# Patient Record
Sex: FEMALE | Race: WHITE | Employment: FULL TIME | URBAN - METROPOLITAN AREA
[De-identification: names, ages, dates, MRNs, and addresses within clinical notes are randomized per-mention and may not be internally consistent; named-entity substitution may affect disease eponyms.]

---

## 2017-10-03 PROBLEM — F50.89 PICA IN ADULTS: Status: ACTIVE | Noted: 2017-10-03

## 2017-10-03 PROBLEM — R05.3 COUGH, PERSISTENT: Status: ACTIVE | Noted: 2017-07-02

## 2017-10-03 PROBLEM — F51.01 PRIMARY INSOMNIA: Status: ACTIVE | Noted: 2017-10-03

## 2017-10-03 PROBLEM — E55.9 VITAMIN D DEFICIENCY: Status: ACTIVE | Noted: 2017-10-03

## 2017-10-03 PROBLEM — Z76.89 ESTABLISHING CARE WITH NEW DOCTOR, ENCOUNTER FOR: Status: ACTIVE | Noted: 2017-10-03

## 2017-10-11 PROBLEM — N95.1 PERI-MENOPAUSE: Status: ACTIVE | Noted: 2017-10-11

## 2017-10-11 PROBLEM — D50.0 IRON DEFICIENCY ANEMIA DUE TO CHRONIC BLOOD LOSS: Status: ACTIVE | Noted: 2017-10-11

## 2017-10-26 ENCOUNTER — HOSPITAL ENCOUNTER (OUTPATIENT)
Dept: LAB | Age: 52
Discharge: HOME | End: 2017-10-26

## 2017-10-26 PROCEDURE — 88305 TISSUE EXAM BY PATHOLOGIST: CPT | Performed by: INTERNAL MEDICINE

## 2017-10-26 PROCEDURE — 88312 SPECIAL STAINS GROUP 1: CPT | Performed by: INTERNAL MEDICINE

## 2017-12-18 ENCOUNTER — HOSPITAL ENCOUNTER (OUTPATIENT)
Dept: MAMMOGRAPHY | Age: 52
Discharge: HOME | End: 2017-12-18
Attending: FAMILY MEDICINE
Payer: COMMERCIAL

## 2017-12-18 DIAGNOSIS — Z12.39 SCREENING FOR MALIGNANT NEOPLASM OF BREAST: ICD-10-CM

## 2017-12-18 PROCEDURE — 77067 SCR MAMMO BI INCL CAD: CPT

## 2018-03-29 PROBLEM — Z79.899 ENCOUNTER FOR LONG-TERM (CURRENT) USE OF MEDICATIONS: Status: ACTIVE | Noted: 2018-03-29

## 2018-06-12 PROBLEM — R00.2 HEART PALPITATIONS: Status: ACTIVE | Noted: 2018-06-12

## 2018-06-12 PROBLEM — E66.01 SEVERE OBESITY (BMI 35.0-39.9): Status: ACTIVE | Noted: 2018-06-12

## 2018-06-15 PROBLEM — E78.2 MIXED HYPERLIPIDEMIA: Status: ACTIVE | Noted: 2018-06-15

## 2018-06-15 PROBLEM — E78.2 MIXED HYPERLIPIDEMIA: Chronic | Status: ACTIVE | Noted: 2018-06-15

## 2018-06-15 PROBLEM — E55.9 VITAMIN D DEFICIENCY: Chronic | Status: ACTIVE | Noted: 2017-10-03

## 2018-11-15 PROBLEM — D50.9 IRON DEFICIENCY ANEMIA: Status: ACTIVE | Noted: 2018-11-15

## 2019-04-23 PROBLEM — M25.562 ACUTE PAIN OF LEFT KNEE: Status: ACTIVE | Noted: 2019-04-23

## 2019-04-23 PROBLEM — I10 ESSENTIAL HYPERTENSION: Status: ACTIVE | Noted: 2019-04-23

## 2020-07-29 ENCOUNTER — HOSPITAL ENCOUNTER (OUTPATIENT)
Dept: MAMMOGRAPHY | Age: 55
Discharge: HOME | End: 2020-07-29
Attending: FAMILY MEDICINE

## 2020-07-29 DIAGNOSIS — Z12.39 SCREENING FOR BREAST CANCER: ICD-10-CM

## 2021-03-10 ENCOUNTER — TRANSCRIBE ORDER (OUTPATIENT)
Dept: SCHEDULING | Age: 56
End: 2021-03-10

## 2021-03-10 DIAGNOSIS — Z12.31 VISIT FOR SCREENING MAMMOGRAM: Primary | ICD-10-CM

## 2021-06-15 PROBLEM — E66.01 SEVERE OBESITY WITH BODY MASS INDEX (BMI) OF 35.0 TO 39.9 WITH SERIOUS COMORBIDITY (HCC): Status: RESOLVED | Noted: 2018-06-12 | Resolved: 2021-06-15

## 2021-08-10 ENCOUNTER — HOSPITAL ENCOUNTER (OUTPATIENT)
Dept: MAMMOGRAPHY | Age: 56
Discharge: HOME | End: 2021-08-10

## 2021-08-10 DIAGNOSIS — Z12.31 VISIT FOR SCREENING MAMMOGRAM: ICD-10-CM

## 2021-12-30 PROBLEM — H52.10 MYOPIA: Status: ACTIVE | Noted: 2018-09-04

## 2021-12-30 PROBLEM — Z20.822 EXPOSURE TO CONFIRMED CASE OF COVID-19: Status: ACTIVE | Noted: 2021-12-30

## 2021-12-30 PROBLEM — H52.229 REGULAR ASTIGMATISM: Status: ACTIVE | Noted: 2018-09-04

## 2022-02-01 PROBLEM — U07.1 COVID-19: Status: ACTIVE | Noted: 2022-02-01

## 2022-02-01 PROBLEM — Z86.16 HISTORY OF COVID-19: Status: ACTIVE | Noted: 2022-02-01

## 2022-03-18 PROBLEM — D50.9 IRON DEFICIENCY ANEMIA: Status: ACTIVE | Noted: 2018-11-15

## 2022-03-18 PROBLEM — H52.10 MYOPIA: Status: ACTIVE | Noted: 2018-09-04

## 2022-03-18 PROBLEM — E55.9 VITAMIN D DEFICIENCY: Status: ACTIVE | Noted: 2017-10-03

## 2022-03-19 PROBLEM — D50.0 IRON DEFICIENCY ANEMIA DUE TO CHRONIC BLOOD LOSS: Status: ACTIVE | Noted: 2017-10-11

## 2022-03-19 PROBLEM — H52.229 REGULAR ASTIGMATISM: Status: ACTIVE | Noted: 2018-09-04

## 2022-03-19 PROBLEM — M25.562 ACUTE PAIN OF LEFT KNEE: Status: ACTIVE | Noted: 2019-04-23

## 2022-03-19 PROBLEM — R05.3 COUGH, PERSISTENT: Status: ACTIVE | Noted: 2017-07-02

## 2022-03-19 PROBLEM — E78.2 MIXED HYPERLIPIDEMIA: Status: ACTIVE | Noted: 2018-06-15

## 2022-03-19 PROBLEM — F50.89 PICA IN ADULTS: Status: ACTIVE | Noted: 2017-10-03

## 2022-03-19 PROBLEM — U07.1 COVID-19: Status: ACTIVE | Noted: 2022-02-01

## 2022-03-19 PROBLEM — N95.1 PERI-MENOPAUSE: Status: ACTIVE | Noted: 2017-10-11

## 2022-03-19 PROBLEM — Z86.16 HISTORY OF COVID-19: Status: ACTIVE | Noted: 2022-02-01

## 2022-03-19 PROBLEM — Z79.899 ENCOUNTER FOR LONG-TERM (CURRENT) USE OF MEDICATIONS: Status: ACTIVE | Noted: 2018-03-29

## 2022-03-19 PROBLEM — Z20.822 EXPOSURE TO CONFIRMED CASE OF COVID-19: Status: ACTIVE | Noted: 2021-12-30

## 2022-03-19 PROBLEM — F50.83 PICA IN ADULTS: Status: ACTIVE | Noted: 2017-10-03

## 2022-03-19 PROBLEM — Z76.89 ESTABLISHING CARE WITH NEW DOCTOR, ENCOUNTER FOR: Status: ACTIVE | Noted: 2017-10-03

## 2022-03-19 PROBLEM — I10 ESSENTIAL HYPERTENSION: Status: ACTIVE | Noted: 2019-04-23

## 2022-03-19 PROBLEM — R00.2 HEART PALPITATIONS: Status: ACTIVE | Noted: 2018-06-12

## 2022-03-20 PROBLEM — F51.01 PRIMARY INSOMNIA: Status: ACTIVE | Noted: 2017-10-03

## 2022-06-13 DIAGNOSIS — E55.9 VITAMIN D DEFICIENCY: ICD-10-CM

## 2022-06-13 DIAGNOSIS — E78.2 MIXED HYPERLIPIDEMIA: ICD-10-CM

## 2022-06-13 DIAGNOSIS — D50.9 IRON DEFICIENCY ANEMIA, UNSPECIFIED IRON DEFICIENCY ANEMIA TYPE: Primary | ICD-10-CM

## 2022-06-13 DIAGNOSIS — Z79.899 ENCOUNTER FOR LONG-TERM (CURRENT) USE OF MEDICATIONS: ICD-10-CM

## 2022-06-13 DIAGNOSIS — D50.9 IRON DEFICIENCY ANEMIA, UNSPECIFIED IRON DEFICIENCY ANEMIA TYPE: ICD-10-CM

## 2022-06-14 LAB
25(OH)D3 SERPL-MCNC: 46.6 NG/ML (ref 30–100)
ALBUMIN SERPL-MCNC: 3.9 G/DL (ref 3.5–5)
ALBUMIN/GLOB SERPL: 1.3 {RATIO} (ref 1.2–3.5)
ALP SERPL-CCNC: 80 U/L (ref 50–136)
ALT SERPL-CCNC: 20 U/L (ref 12–65)
ANION GAP SERPL CALC-SCNC: 12 MMOL/L (ref 7–16)
AST SERPL-CCNC: 18 U/L (ref 15–37)
BASOPHILS # BLD: 0 K/UL (ref 0–0.2)
BASOPHILS NFR BLD: 1 % (ref 0–2)
BILIRUB SERPL-MCNC: 0.4 MG/DL (ref 0.2–1.1)
BUN SERPL-MCNC: 17 MG/DL (ref 6–23)
CALCIUM SERPL-MCNC: 9.5 MG/DL (ref 8.3–10.4)
CHLORIDE SERPL-SCNC: 110 MMOL/L (ref 98–107)
CHOLEST SERPL-MCNC: 171 MG/DL
CO2 SERPL-SCNC: 21 MMOL/L (ref 21–32)
CREAT SERPL-MCNC: 0.7 MG/DL (ref 0.6–1)
DIFFERENTIAL METHOD BLD: ABNORMAL
EOSINOPHIL # BLD: 0.1 K/UL (ref 0–0.8)
EOSINOPHIL NFR BLD: 2 % (ref 0.5–7.8)
ERYTHROCYTE [DISTWIDTH] IN BLOOD BY AUTOMATED COUNT: 14.2 % (ref 11.9–14.6)
GLOBULIN SER CALC-MCNC: 3 G/DL (ref 2.3–3.5)
GLUCOSE SERPL-MCNC: 76 MG/DL (ref 65–100)
HCT VFR BLD AUTO: 35.7 % (ref 35.8–46.3)
HDLC SERPL-MCNC: 58 MG/DL (ref 40–60)
HDLC SERPL: 2.9 {RATIO}
HGB BLD-MCNC: 11.1 G/DL (ref 11.7–15.4)
IMM GRANULOCYTES # BLD AUTO: 0 K/UL (ref 0–0.5)
IMM GRANULOCYTES NFR BLD AUTO: 0 % (ref 0–5)
LDLC SERPL CALC-MCNC: 94.2 MG/DL
LYMPHOCYTES # BLD: 2.1 K/UL (ref 0.5–4.6)
LYMPHOCYTES NFR BLD: 35 % (ref 13–44)
MCH RBC QN AUTO: 29 PG (ref 26.1–32.9)
MCHC RBC AUTO-ENTMCNC: 31.1 G/DL (ref 31.4–35)
MCV RBC AUTO: 93.2 FL (ref 79.6–97.8)
MONOCYTES # BLD: 0.4 K/UL (ref 0.1–1.3)
MONOCYTES NFR BLD: 6 % (ref 4–12)
NEUTS SEG # BLD: 3.5 K/UL (ref 1.7–8.2)
NEUTS SEG NFR BLD: 56 % (ref 43–78)
NRBC # BLD: 0 K/UL (ref 0–0.2)
PLATELET # BLD AUTO: 274 K/UL (ref 150–450)
PMV BLD AUTO: 10.7 FL (ref 9.4–12.3)
POTASSIUM SERPL-SCNC: 4.3 MMOL/L (ref 3.5–5.1)
PROT SERPL-MCNC: 6.9 G/DL (ref 6.3–8.2)
RBC # BLD AUTO: 3.83 M/UL (ref 4.05–5.2)
SODIUM SERPL-SCNC: 143 MMOL/L (ref 136–145)
TRIGL SERPL-MCNC: 94 MG/DL (ref 35–150)
VLDLC SERPL CALC-MCNC: 18.8 MG/DL (ref 6–23)
WBC # BLD AUTO: 6.1 K/UL (ref 4.3–11.1)

## 2022-06-20 ENCOUNTER — OFFICE VISIT (OUTPATIENT)
Dept: PRIMARY CARE CLINIC | Facility: CLINIC | Age: 57
End: 2022-06-20
Payer: COMMERCIAL

## 2022-06-20 VITALS
WEIGHT: 215 LBS | BODY MASS INDEX: 35.82 KG/M2 | DIASTOLIC BLOOD PRESSURE: 75 MMHG | TEMPERATURE: 97.9 F | OXYGEN SATURATION: 99 % | HEIGHT: 65 IN | SYSTOLIC BLOOD PRESSURE: 136 MMHG | HEART RATE: 58 BPM

## 2022-06-20 DIAGNOSIS — Z12.31 ENCOUNTER FOR SCREENING MAMMOGRAM FOR MALIGNANT NEOPLASM OF BREAST: ICD-10-CM

## 2022-06-20 DIAGNOSIS — D50.8 OTHER IRON DEFICIENCY ANEMIA: ICD-10-CM

## 2022-06-20 DIAGNOSIS — E55.9 VITAMIN D DEFICIENCY: ICD-10-CM

## 2022-06-20 DIAGNOSIS — I10 ESSENTIAL HYPERTENSION: Primary | ICD-10-CM

## 2022-06-20 DIAGNOSIS — J30.1 SEASONAL ALLERGIC RHINITIS DUE TO POLLEN: ICD-10-CM

## 2022-06-20 DIAGNOSIS — Z79.899 ENCOUNTER FOR LONG-TERM (CURRENT) USE OF MEDICATIONS: ICD-10-CM

## 2022-06-20 PROBLEM — C44.311 BASAL CELL CARCINOMA OF NOSE: Status: ACTIVE | Noted: 2022-06-20

## 2022-06-20 PROCEDURE — 99213 OFFICE O/P EST LOW 20 MIN: CPT | Performed by: FAMILY MEDICINE

## 2022-06-20 RX ORDER — IRON POLYSACCHARIDE COMPLEX 150 MG
150 CAPSULE ORAL DAILY
Qty: 60 CAPSULE | Refills: 5 | Status: SHIPPED | OUTPATIENT
Start: 2022-06-20

## 2022-06-20 RX ORDER — FLUTICASONE PROPIONATE 50 MCG
2 SPRAY, SUSPENSION (ML) NASAL DAILY
Qty: 16 G | Refills: 5 | Status: SHIPPED | OUTPATIENT
Start: 2022-06-20

## 2022-06-20 RX ORDER — ERGOCALCIFEROL (VITAMIN D2) 1250 MCG
50000 CAPSULE ORAL
Qty: 4 CAPSULE | Refills: 5 | Status: SHIPPED | OUTPATIENT
Start: 2022-06-20

## 2022-06-20 SDOH — ECONOMIC STABILITY: FOOD INSECURITY: WITHIN THE PAST 12 MONTHS, THE FOOD YOU BOUGHT JUST DIDN'T LAST AND YOU DIDN'T HAVE MONEY TO GET MORE.: NEVER TRUE

## 2022-06-20 SDOH — ECONOMIC STABILITY: FOOD INSECURITY: WITHIN THE PAST 12 MONTHS, YOU WORRIED THAT YOUR FOOD WOULD RUN OUT BEFORE YOU GOT MONEY TO BUY MORE.: NEVER TRUE

## 2022-06-20 ASSESSMENT — PATIENT HEALTH QUESTIONNAIRE - PHQ9
SUM OF ALL RESPONSES TO PHQ QUESTIONS 1-9: 1
2. FEELING DOWN, DEPRESSED OR HOPELESS: 0
SUM OF ALL RESPONSES TO PHQ QUESTIONS 1-9: 1
SUM OF ALL RESPONSES TO PHQ9 QUESTIONS 1 & 2: 1
1. LITTLE INTEREST OR PLEASURE IN DOING THINGS: 1

## 2022-06-20 ASSESSMENT — SOCIAL DETERMINANTS OF HEALTH (SDOH): HOW HARD IS IT FOR YOU TO PAY FOR THE VERY BASICS LIKE FOOD, HOUSING, MEDICAL CARE, AND HEATING?: NOT HARD AT ALL

## 2022-06-20 NOTE — PROGRESS NOTES
Masood Yi M.D.  [address] [facility] lenin Mahmood  Phone:  [phone]  Fax:  [phone]:  Chief Complaint   Patient presents with    Results     pt is here to discuss recent lab test results     Hypertension     pt states blood pressure is up and down. It gets to 150 some days, generally over 78           HISTORY OF PRESENT ILLNESS:  Ms. Starla Denver is a 59 y.o. female  who presents for follow up. Patient states that her blood pressures have been up and down. Some days it is 150/78. Today it is good at 136/75. She denies any chest pain, shortness of breath, headaches, or blurred vision. No other complaints. Taking medications as prescribed. Medications reviewed and updated. HISTORY:  No Known Allergies      REVIEW OF SYSTEMS:  Review of systems is as indicated in HPI otherwise negative. PHYSICAL EXAM:  Vital Signs -   Visit Vitals  /75   Pulse 58   Temp 97.9 °F (36.6 °C) (Temporal)   Ht 5' 5\" (1.651 m)   Wt 215 lb (97.5 kg)   SpO2 99%   BMI 35.78 kg/m²        Physical Exam  Vitals and nursing note reviewed. Constitutional:       Appearance: Normal appearance. HENT:      Head: Normocephalic and atraumatic. Nose: Nose normal.      Mouth/Throat:      Mouth: Mucous membranes are moist.   Eyes:      Extraocular Movements: Extraocular movements intact. Pupils: Pupils are equal, round, and reactive to light. Cardiovascular:      Rate and Rhythm: Normal rate and regular rhythm. Pulses: Normal pulses. Pulmonary:      Effort: Pulmonary effort is normal.      Breath sounds: Normal breath sounds. Abdominal:      General: Abdomen is flat. Palpations: Abdomen is soft. Musculoskeletal:         General: Normal range of motion. Cervical back: Normal range of motion and neck supple. Skin:     General: Skin is warm and dry. Neurological:      Mental Status: She is alert.    Psychiatric:         Mood and Affect: Mood normal.         Behavior: Behavior normal.       PHQ:  PHQ-9  6/20/2022   Little interest or pleasure in doing things 1   Little interest or pleasure in doing things -   Feeling down, depressed, or hopeless 0   PHQ-2 Score 1   Total Score PHQ 2 -   PHQ-9 Total Score 1       LABS    Orders Only on 06/13/2022   Component Date Value Ref Range Status    Vit D, 25-Hydroxy 06/13/2022 46.6  30.0 - 100.0 ng/mL Final    Cholesterol, Total 06/13/2022 171  <200 MG/DL Final    Comment: Borderline High: 200-239 mg/dL  High: Greater than or equal to 240 mg/dL      Triglycerides 06/13/2022 94  35 - 150 MG/DL Final    Comment: Borderline High: 150-199 mg/dL, High: 200-499 mg/dL  Very High: Greater than or equal to 500 mg/dL      HDL 06/13/2022 58  40 - 60 MG/DL Final    LDL Calculated 06/13/2022 94.2  <100 MG/DL Final    Comment: Near Optimal: 100-129 mg/dL  Borderline High: 130-159, High: 160-189 mg/dL  Very High: Greater than or equal to 190 mg/dL      VLDL Cholesterol Calculated 06/13/2022 18.8  6.0 - 23.0 MG/DL Final    Chol/HDL Ratio 06/13/2022 2.9    Final    Sodium 06/13/2022 143  136 - 145 mmol/L Final    Potassium 06/13/2022 4.3  3.5 - 5.1 mmol/L Final    Chloride 06/13/2022 110* 98 - 107 mmol/L Final    CO2 06/13/2022 21  21 - 32 mmol/L Final    Anion Gap 06/13/2022 12  7 - 16 mmol/L Final    Glucose 06/13/2022 76  65 - 100 mg/dL Final    BUN 06/13/2022 17  6 - 23 MG/DL Final    CREATININE 06/13/2022 0.70  0.6 - 1.0 MG/DL Final    GFR  06/13/2022 >60  >60 ml/min/1.73m2 Final    GFR Non- 06/13/2022 >60  >60 ml/min/1.73m2 Final    Comment:   Estimated GFR is calculated using the Modification of Diet in Renal Disease (MDRD) Study equation, reported for both  Americans (GFRAA) and non- Americans (GFRNA), and normalized to 1.73m2 body surface area. The physician must decide which value applies to the patient.   The MDRD study equation should only be used in individuals age 25 or older. It has not been validated for the following: pregnant women, patients with serious comorbid conditions,or on certain medications, or persons with extremes of body size, muscle mass, or nutritional status.       Calcium 06/13/2022 9.5  8.3 - 10.4 MG/DL Final    Total Bilirubin 06/13/2022 0.4  0.2 - 1.1 MG/DL Final    ALT 06/13/2022 20  12 - 65 U/L Final    AST 06/13/2022 18  15 - 37 U/L Final    Alk Phosphatase 06/13/2022 80  50 - 136 U/L Final    Total Protein 06/13/2022 6.9  6.3 - 8.2 g/dL Final    Albumin 06/13/2022 3.9  3.5 - 5.0 g/dL Final    Globulin 06/13/2022 3.0  2.3 - 3.5 g/dL Final    Albumin/Globulin Ratio 06/13/2022 1.3  1.2 - 3.5   Final    WBC 06/13/2022 6.1  4.3 - 11.1 K/uL Final    RBC 06/13/2022 3.83* 4.05 - 5.2 M/uL Final    Hemoglobin 06/13/2022 11.1* 11.7 - 15.4 g/dL Final    Hematocrit 06/13/2022 35.7* 35.8 - 46.3 % Final    MCV 06/13/2022 93.2  79.6 - 97.8 FL Final    MCH 06/13/2022 29.0  26.1 - 32.9 PG Final    MCHC 06/13/2022 31.1* 31.4 - 35.0 g/dL Final    RDW 06/13/2022 14.2  11.9 - 14.6 % Final    Platelets 41/62/5847 274  150 - 450 K/uL Final    MPV 06/13/2022 10.7  9.4 - 12.3 FL Final    nRBC 06/13/2022 0.00  0.0 - 0.2 K/uL Final    **Note: Absolute NRBC parameter is now reported with Hemogram**    Differential Type 06/13/2022 AUTOMATED    Final    Seg Neutrophils 06/13/2022 56  43 - 78 % Final    Lymphocytes 06/13/2022 35  13 - 44 % Final    Monocytes 06/13/2022 6  4.0 - 12.0 % Final    Eosinophils % 06/13/2022 2  0.5 - 7.8 % Final    Basophils 06/13/2022 1  0.0 - 2.0 % Final    Immature Granulocytes 06/13/2022 0  0.0 - 5.0 % Final    Segs Absolute 06/13/2022 3.5  1.7 - 8.2 K/UL Final    Absolute Lymph # 06/13/2022 2.1  0.5 - 4.6 K/UL Final    Absolute Mono # 06/13/2022 0.4  0.1 - 1.3 K/UL Final    Absolute Eos # 06/13/2022 0.1  0.0 - 0.8 K/UL Final    Basophils Absolute 06/13/2022 0.0  0.0 - 0.2 K/UL Final    Absolute Immature Granulocyte 06/13/2022 0.0  0.0 - 0.5 K/UL Final       IMPRESSION/PLAN     Diagnosis Orders   1. Essential hypertension     2. Other iron deficiency anemia  iron polysaccharides (NIFEREX) 150 MG capsule   3. Seasonal allergic rhinitis due to pollen  fluticasone (FLONASE) 50 MCG/ACT nasal spray   4. Vitamin D deficiency  ergocalciferol (ERGOCALCIFEROL) 1.25 MG (28138 UT) capsule   5. Encounter for screening mammogram for malignant neoplasm of breast  JAYMIE RUTH DIGITAL SCREEN BILATERAL   6. Encounter for long-term (current) use of medications         Follow up and Dispositions:  Return in about 6 months (around 12/20/2022). Patient is stable on medications and will continue current medications. Refilled the above medications. Reviewed medications and side effects in detail. Was given samples of   Reviewed most recent labs. Reviewed diet, exercise and weight control. Cardiovascular risks and recommendations reviewed. Patient encouraged to follow a low sodium diet. Use of aspirin to prevent MIs and TIAs discussed. Will order her screening mammogram.    Marshal Babinski, MD    Dictated using voice recognition software.  Proofread, but unrecognized voice recognition errors may exist.

## 2022-07-15 ENCOUNTER — HOSPITAL ENCOUNTER (EMERGENCY)
Dept: GENERAL RADIOLOGY | Age: 57
Discharge: HOME | End: 2022-07-18
Payer: COMMERCIAL

## 2022-07-15 ENCOUNTER — HOSPITAL ENCOUNTER (EMERGENCY)
Age: 57
Discharge: HOME | End: 2022-07-15
Attending: EMERGENCY MEDICINE
Payer: COMMERCIAL

## 2022-07-15 ENCOUNTER — TELEPHONE (OUTPATIENT)
Dept: PRIMARY CARE CLINIC | Facility: CLINIC | Age: 57
End: 2022-07-15

## 2022-07-15 VITALS
WEIGHT: 206 LBS | OXYGEN SATURATION: 97 % | SYSTOLIC BLOOD PRESSURE: 149 MMHG | BODY MASS INDEX: 34.32 KG/M2 | HEART RATE: 59 BPM | RESPIRATION RATE: 15 BRPM | HEIGHT: 65 IN | DIASTOLIC BLOOD PRESSURE: 78 MMHG | TEMPERATURE: 97.8 F

## 2022-07-15 DIAGNOSIS — R00.2 PALPITATIONS: Primary | ICD-10-CM

## 2022-07-15 LAB
ALBUMIN SERPL-MCNC: 3.5 G/DL (ref 3.5–5)
ALBUMIN/GLOB SERPL: 0.9 {RATIO} (ref 1.2–3.5)
ALP SERPL-CCNC: 79 U/L (ref 50–136)
ALT SERPL-CCNC: 19 U/L (ref 12–65)
ANION GAP SERPL CALC-SCNC: 2 MMOL/L (ref 7–16)
AST SERPL-CCNC: 18 U/L (ref 15–37)
BILIRUB SERPL-MCNC: 0.3 MG/DL (ref 0.2–1.1)
BUN SERPL-MCNC: 18 MG/DL (ref 6–23)
CALCIUM SERPL-MCNC: 9.2 MG/DL (ref 8.3–10.4)
CHLORIDE SERPL-SCNC: 109 MMOL/L (ref 98–107)
CO2 SERPL-SCNC: 30 MMOL/L (ref 21–32)
CREAT SERPL-MCNC: 0.7 MG/DL (ref 0.6–1)
EKG ATRIAL RATE: 65 BPM
EKG DIAGNOSIS: NORMAL
EKG P AXIS: 43 DEGREES
EKG P-R INTERVAL: 154 MS
EKG Q-T INTERVAL: 396 MS
EKG QRS DURATION: 76 MS
EKG QTC CALCULATION (BAZETT): 411 MS
EKG R AXIS: 26 DEGREES
EKG T AXIS: 34 DEGREES
EKG VENTRICULAR RATE: 65 BPM
ERYTHROCYTE [DISTWIDTH] IN BLOOD BY AUTOMATED COUNT: 13.5 % (ref 11.9–14.6)
GLOBULIN SER CALC-MCNC: 3.9 G/DL (ref 2.3–3.5)
GLUCOSE SERPL-MCNC: 79 MG/DL (ref 65–100)
HCT VFR BLD AUTO: 36.7 % (ref 35.8–46.3)
HGB BLD-MCNC: 11.9 G/DL (ref 11.7–15.4)
LIPASE SERPL-CCNC: 132 U/L (ref 73–393)
MAGNESIUM SERPL-MCNC: 2.1 MG/DL (ref 1.8–2.4)
MCH RBC QN AUTO: 28.9 PG (ref 26.1–32.9)
MCHC RBC AUTO-ENTMCNC: 32.4 G/DL (ref 31.4–35)
MCV RBC AUTO: 89.1 FL (ref 79.6–97.8)
NRBC # BLD: 0 K/UL (ref 0–0.2)
PLATELET # BLD AUTO: 257 K/UL (ref 150–450)
PMV BLD AUTO: 10.3 FL (ref 9.4–12.3)
POTASSIUM SERPL-SCNC: 4.1 MMOL/L (ref 3.5–5.1)
PROT SERPL-MCNC: 7.4 G/DL (ref 6.3–8.2)
RBC # BLD AUTO: 4.12 M/UL (ref 4.05–5.2)
SODIUM SERPL-SCNC: 141 MMOL/L (ref 136–145)
TROPONIN I SERPL HS-MCNC: 6.3 PG/ML (ref 0–14)
TROPONIN I SERPL HS-MCNC: 8 PG/ML (ref 0–14)
WBC # BLD AUTO: 5.7 K/UL (ref 4.3–11.1)

## 2022-07-15 PROCEDURE — 93005 ELECTROCARDIOGRAM TRACING: CPT | Performed by: EMERGENCY MEDICINE

## 2022-07-15 PROCEDURE — 83735 ASSAY OF MAGNESIUM: CPT

## 2022-07-15 PROCEDURE — 80053 COMPREHEN METABOLIC PANEL: CPT

## 2022-07-15 PROCEDURE — 94762 N-INVAS EAR/PLS OXIMTRY CONT: CPT

## 2022-07-15 PROCEDURE — 85027 COMPLETE CBC AUTOMATED: CPT

## 2022-07-15 PROCEDURE — 99285 EMERGENCY DEPT VISIT HI MDM: CPT

## 2022-07-15 PROCEDURE — 71046 X-RAY EXAM CHEST 2 VIEWS: CPT

## 2022-07-15 PROCEDURE — 83690 ASSAY OF LIPASE: CPT

## 2022-07-15 PROCEDURE — 84484 ASSAY OF TROPONIN QUANT: CPT

## 2022-07-15 ASSESSMENT — LIFESTYLE VARIABLES: HOW OFTEN DO YOU HAVE A DRINK CONTAINING ALCOHOL: NEVER

## 2022-07-15 ASSESSMENT — PAIN DESCRIPTION - DESCRIPTORS: DESCRIPTORS: PRESSURE

## 2022-07-15 ASSESSMENT — PAIN SCALES - GENERAL: PAINLEVEL_OUTOF10: 4

## 2022-07-15 ASSESSMENT — PAIN DESCRIPTION - LOCATION: LOCATION: CHEST

## 2022-07-15 NOTE — DISCHARGE INSTRUCTIONS
Have referred you to [facility] cardiology and I have called a consult into them.   You will most likely hear from [facility] cardiology on Monday if you do not hear from them by midday on Monday call the office  Return at any point with prolonged episode of sense of palpitations  Aspirin 81 mg/day until follow-up then as per their recommendations

## 2022-07-15 NOTE — ED NOTES
I have reviewed discharge instructions with the patient. The patient verbalized understanding. Patient left ED via Discharge Method: ambulatory to Home with (Spouse). Opportunity for questions and clarification provided. Patient given 0 scripts. To continue your aftercare when you leave the hospital, you may receive an automated call from our care team to check in on how you are doing. This is a free service and part of our promise to provide the best care and service to meet your aftercare needs.  If you have questions, or wish to unsubscribe from this service please call [phone]. Thank you for Choosing our [facility] Emergency Department.         Arian Pérez RN  07/15/22 1281

## 2022-07-15 NOTE — ED PROVIDER NOTES
[facility] Emergency Department Provider Note                   PCP:                Millicent Jeans, MD               Age: 59 y.o. Sex: female     No diagnosis found. DISPOSITION         MDM  Number of Diagnoses or Management Options  Palpitations: new, needed workup  Diagnosis management comments: We will have patient follow-up with [facility] cardiology. She is stable throughout her stay in our department. Lab work has no identified precipitating cause. She does not have any anginal component associated with this. She is otherwise baseline healthy. A call has been placed [facility] cardiology for follow-up. Patient is to return with any worsening. Amount and/or Complexity of Data Reviewed  Clinical lab tests: ordered and reviewed  Tests in the radiology section of CPT®: ordered and reviewed  Obtain history from someone other than the patient: yes  Discuss the patient with other providers: yes    Risk of Complications, Morbidity, and/or Mortality  Presenting problems: high  Diagnostic procedures: high  Management options: high    Patient Progress  Patient progress: stable       Orders Placed This Encounter   Procedures    XR CHEST (2 VW)    CBC    Comprehensive Metabolic Panel    Troponin    Lipase    Magnesium    Pulse Oximetry    Cardiac Monitor    EKG 12 Lead    Saline lock IV        Elaine Berman is a 59 y.o. female who presents to the Emergency Department with chief complaint of    Chief Complaint   Patient presents with    Palpitations      For several months she has had a sense of palpations though intermittent. Time she says it feels as though her heart might be fluttering. When asked the duration of the symptoms last anywhere from a second or 20 sec to maybe 1 minute but never longer. She is otherwise healthy. No his free of excess caffeine use and does not drink energy drinks or coffee. She does not have any cardiac history. No fever or infectious changes.   No history of thyroid abnormalities. No new medicines. No acute infectious changes or blood loss. No history of DVT or PE type symptoms or complaints    The history is provided by the patient and the spouse. Palpitations  Palpitations quality:  Fast  Timing:  Sporadic  Context: not anxiety, not appetite suppressants, not blood loss, not caffeine, not illicit drugs and not nicotine    Relieved by: Spontaneous without intervention. Associated symptoms: no diaphoresis, no leg pain, no lower extremity edema, no orthopnea, no PND and no syncope      All other systems reviewed and are negative. Review of Systems   Constitutional:  Negative for chills, diaphoresis and fatigue. Cardiovascular:  Positive for palpitations. Negative for orthopnea, syncope and PND. Endocrine: Negative for heat intolerance. No hyperthyroid changes   Neurological: Negative. Psychiatric/Behavioral:  Negative for confusion and decreased concentration. All other systems reviewed and are negative. Past Medical History:   Diagnosis Date    Cervical polyp     Skin cancer         Past Surgical History:   Procedure Laterality Date    OTHER SURGICAL HISTORY Left 09/27/2018    Skin Cancer Removal (Left Arm)    OTHER SURGICAL HISTORY      skin cancer removed from arm and face 2005    TUBAL LIGATION          Family History   Problem Relation Age of Onset    Lung Disease Mother     Kidney Disease Father     Heart Disease Mother         Social Connections: Not on file        No Known Allergies     Vitals signs and nursing note reviewed. Patient Vitals for the past 4 hrs:   Temp Pulse Resp BP SpO2   07/15/22 1636 -- 61 18 (!) 142/71 100 %   07/15/22 1345 97.8 °F (36.6 °C) 61 16 (!) 152/81 100 %          Physical Exam  Vitals and nursing note reviewed. HENT:      Head: Atraumatic.       Right Ear: External ear normal.      Left Ear: External ear normal.      Nose: Nose normal.      Mouth/Throat:      Mouth: Mucous membranes are moist.   Eyes: General: No scleral icterus. Cardiovascular:      Rate and Rhythm: Normal rate and regular rhythm. Pulses: Normal pulses. Pulmonary:      Breath sounds: No wheezing or rales. Abdominal:      Palpations: Abdomen is soft. Musculoskeletal:         General: No swelling or tenderness. Cervical back: Normal range of motion. Right lower leg: No edema. Left lower leg: No edema. Skin:     General: Skin is warm and dry. Coloration: Skin is not pale. Neurological:      Mental Status: She is alert. Mental status is at baseline. Psychiatric:         Behavior: Behavior normal.        Procedures    ED EKG Interpretation  EKG was interpreted in the absence of a cardiologist.    Rate: Rate: Normal  EKG Interpretation: EKG Interpretation: sinus rhythm  ST Segments: Normal ST segments - NO STEMI    Labs Reviewed   COMPREHENSIVE METABOLIC PANEL - Abnormal; Notable for the following components:       Result Value    Chloride 109 (*)     Anion Gap 2 (*)     Globulin 3.9 (*)     Albumin/Globulin Ratio 0.9 (*)     All other components within normal limits   CBC   TROPONIN   LIPASE   MAGNESIUM   TROPONIN        XR CHEST (2 VW)   Final Result   No acute findings in the chest                                  Voice dictation software was used during the making of this note. This software is not perfect and grammatical and other typographical errors may be present. This note has not been completely proofread for errors.      Marquis Scott MD  07/17/22 8053

## 2022-07-15 NOTE — ED TRIAGE NOTES
Pt arrives ambulatory to triage. Reports palpitations, chest pressure, and shortness of breath for \"a little while\" but states it had been more \"steady\" for 3-4 days. Pt denies cardiac or pulmonary hx. Pt denies hx of anxiety. Denies coughing. Denies swelling to legs or arms, denies any recent long travel. Denies smoking cigarettes. Denies any aggravating or alleviating factors. NAD. Masked.

## 2022-07-15 NOTE — TELEPHONE ENCOUNTER
Patient called c/o shortness of breath, rapid heart rate and a pressure on the chest. Per Dr Wilberto Wu recommendation patient should report to ER directly either by EMS or someone should drive her.  Pt acknowledges and will proceed

## 2022-07-17 ASSESSMENT — ENCOUNTER SYMPTOMS: ORTHOPNEA: 0

## 2022-08-12 ENCOUNTER — HOSPITAL ENCOUNTER (OUTPATIENT)
Dept: MAMMOGRAPHY | Age: 57
Discharge: HOME | End: 2022-08-15
Payer: COMMERCIAL

## 2022-08-12 DIAGNOSIS — Z12.31 ENCOUNTER FOR SCREENING MAMMOGRAM FOR MALIGNANT NEOPLASM OF BREAST: ICD-10-CM

## 2022-08-12 PROCEDURE — 77063 BREAST TOMOSYNTHESIS BI: CPT

## 2022-08-22 ENCOUNTER — OFFICE VISIT (OUTPATIENT)
Dept: CARDIOLOGY CLINIC | Age: 57
End: 2022-08-22
Payer: COMMERCIAL

## 2022-08-22 VITALS
DIASTOLIC BLOOD PRESSURE: 90 MMHG | SYSTOLIC BLOOD PRESSURE: 140 MMHG | BODY MASS INDEX: 35.82 KG/M2 | WEIGHT: 215 LBS | HEART RATE: 68 BPM | HEIGHT: 65 IN

## 2022-08-22 DIAGNOSIS — I10 ESSENTIAL HYPERTENSION: ICD-10-CM

## 2022-08-22 DIAGNOSIS — R00.2 HEART PALPITATIONS: Primary | ICD-10-CM

## 2022-08-22 DIAGNOSIS — I49.3 PVC'S (PREMATURE VENTRICULAR CONTRACTIONS): ICD-10-CM

## 2022-08-22 DIAGNOSIS — D50.0 IRON DEFICIENCY ANEMIA DUE TO CHRONIC BLOOD LOSS: ICD-10-CM

## 2022-08-22 PROCEDURE — 99204 OFFICE O/P NEW MOD 45 MIN: CPT | Performed by: INTERNAL MEDICINE

## 2022-08-22 ASSESSMENT — ENCOUNTER SYMPTOMS
DOUBLE VISION: 0
ABDOMINAL PAIN: 0
EYE REDNESS: 0
WHEEZING: 0
HEMATOCHEZIA: 0
HEMATEMESIS: 0
HOARSE VOICE: 0
STRIDOR: 0
HEMOPTYSIS: 0

## 2022-08-22 NOTE — PROGRESS NOTES
[address] [address] Levi Orellana, [address] [facility], [address] [place] [address]  PHONE: [phone]          22    NAME:  Abdoul Alfaro  : 1965  MRN: [id number]         SUBJECTIVE:   Abdoul Alfaro is a 59 y.o. female seen for a visit regarding the following:     Chief Complaint   Patient presents with    Hyperlipidemia    Hypertension           HPI:    Cardio problem list:  1. Palpitations  -3-day Holter monitor from 2022 showed sinus rhythm with an average heart rate at 71 bpm, rare PACs and PVCs  2.  Borderline hypertension  3. Iron deficiency anemia  4. Hyperlipidemia-diet controlled    Dear Dr. Minor Zavala,  I saw Ms. Singer who is a pleasant 78-year-old woman in cardiovascular consultation on 2022 for palpitations with borderline hypertension and diet-controlled hyperlipidemia. She went to the ER on 7/15/2022 with palpitations and her baseline work-up came back fairly normal apart from occasional PVCs. Palpitations/PVCs-she has had palpitations off and on now for a few months but lately, over the last month, she has noticed that it has been more frequent. She had them for 3 days in a row close to when she went into the ER on 7/15/2022 and the initial work-up came back with no concerning laboratory findings. Thyroid function has not been recently checked. Borderline anemic, normal magnesium level and potassium levels. Does not do any excessive caffeine intake. Denies coffee intake of sodas. Occasionally has some diluted tea. -Stress brings on her palpitations but nothing else. Sometimes feels some associated shortness of breath 1 day, when they come in a row and go up into her neck. Sometimes associated with a mild cough. Hypertension: Borderline elevated pressures. Denies headaches or blurry vision. No formal diagnosis of hypertension in the past.  Denies significant lower extremity edema orthopnea PND. Hyperlipidemia-diet controlled for now.     Iron deficiency anemia-has been worked up by GI with no obvious etiology. Still following with Dr. Alee Bynum still on this. On iron supplementation. No complaints of any anginal chest discomfort in any way. No TIAs or strokelike symptoms. Past Medical History, Past Surgical History, Family history, Social History, and Medications were all reviewed with the patient today and updated as necessary.      No Known Allergies  Patient Active Problem List   Diagnosis    Myopia    Iron deficiency anemia    Vitamin D deficiency    Heart palpitations    Pica in adults    Mixed hyperlipidemia    Encounter for long-term (current) use of medications    Establishing care with new doctor, encounter for    Essential hypertension    Acute pain of left knee    History of COVID-19    Regular astigmatism    COVID-19    Exposure to confirmed case of COVID-19    Allison-menopause    Cough, persistent    Iron deficiency anemia due to chronic blood loss    Presbyopia    Primary insomnia    Basal cell carcinoma of nose    Seasonal allergic rhinitis due to pollen     Past Medical History:   Diagnosis Date    Cervical polyp     Skin cancer      Past Surgical History:   Procedure Laterality Date    OTHER SURGICAL HISTORY Left 09/27/2018    Skin Cancer Removal (Left Arm)    OTHER SURGICAL HISTORY      skin cancer removed from arm and face 2005    TUBAL LIGATION       Family History   Problem Relation Age of Onset    Lung Disease Mother     Kidney Disease Father     Heart Disease Mother      Social History     Tobacco Use    Smoking status: Never    Smokeless tobacco: Never   Substance Use Topics    Alcohol use: No     Current Outpatient Medications   Medication Sig Dispense Refill    fluticasone (FLONASE) 50 MCG/ACT nasal spray 2 sprays by Nasal route daily 16 g 5    ergocalciferol (ERGOCALCIFEROL) 1.25 MG (59104 UT) capsule Take 1 capsule by mouth every 7 days 4 capsule 5    iron polysaccharides (NIFEREX) 150 MG capsule Take 1 capsule by mouth daily 60 capsule 5 melatonin 5 MG TABS tablet Take 5 mg by mouth       No current facility-administered medications for this visit. Review of Systems   Constitutional: Negative for chills and fever. HENT:  Negative for ear discharge, hoarse voice and stridor. Eyes:  Negative for double vision and redness. Cardiovascular:  Positive for palpitations. Negative for cyanosis and syncope. Respiratory:  Negative for hemoptysis and wheezing. Endocrine: Negative for polydipsia and polyphagia. Hematologic/Lymphatic: Negative for adenopathy. Skin:  Negative for itching and rash. Musculoskeletal:  Negative for joint swelling and muscle weakness. Gastrointestinal:  Negative for abdominal pain, hematemesis and hematochezia. Genitourinary:  Negative for flank pain and nocturia. Neurological:  Negative for focal weakness and seizures. Psychiatric/Behavioral:  Negative for altered mental status and suicidal ideas. Allergic/Immunologic: Negative for hives. PHYSICAL EXAM:    BP (!) 140/90   Pulse 68   Ht 5' 5\" (1.651 m)   Wt 215 lb (97.5 kg)   BMI 35.78 kg/m²      Physical Exam    General: Alert and oriented in no acute distress  HEENT: Head is normocephalic, atraumatic, pupils are equal bilaterally, throat appears to be clear  Neck: No significant jugular venous distention no cervical bruits  Cardiovascular: S1 and S2 heard, regular rate and rhythm, no significant murmurs rubs or gallops. Respiratory: Clear to auscultation bilaterally with no adventitious sounds, respirations are normal  Abdomen: Soft, nontender, nondistended, bowel sounds present. Extremities: No cyanosis clubbing or edema  Peripheral pulses: Bilateral radial artery pulses are palpated. Bilateral pedal pulses are well felt. Neuro: No facial droop and no gross focal motor deficits  Lymphatic: No significant cervical lymphadenopathy noted. Musculoskeletal: No significant redness or swelling noted in all exposed joints.   Skin: No significant rashes noted the of the exposed regions. Medical problems and test results were reviewed with the patient today. No results found for this or any previous visit (from the past 672 hour(s)). Lab Results   Component Value Date/Time    CHOL 171 06/13/2022 11:43 AM    HDL 58 06/13/2022 11:43 AM   ,hemoglobin, basic metabolic panel, No results found for: TSH, TSH2, TSH3 ,  Lab Results   Component Value Date/Time     07/15/2022 02:12 PM    K 4.1 07/15/2022 02:12 PM     07/15/2022 02:12 PM    CO2 30 07/15/2022 02:12 PM    BUN 18 07/15/2022 02:12 PM    GFRAA >60 07/15/2022 02:12 PM    GLOB 3.9 07/15/2022 02:12 PM    ALT 19 07/15/2022 02:12 PM    AST 18 07/15/2022 02:12 PM      Lab Results   Component Value Date    LDLCALC 94.2 06/13/2022      Lab Results   Component Value Date    CREATININE 0.70 07/15/2022      Lab Results   Component Value Date/Time    MG 2.1 07/15/2022 02:12 PM      No results found for this or any previous visit. EKG from 7/15/2022 showed sinus rhythm at 65 bpm, normal QTC at 411 ms, normal WI interval of 154 ms    ASSESSMENT and PLAN      Heart palpitations  -     Transthoracic echocardiogram (TTE) complete with contrast, bubble, strain, and 3D PRN; Future  -Only occasional PVCs noted on Holter monitor. She wore for 3 days that showed sinus rhythm with average heart rate at 71 bpm.    Essential hypertension  -Has only been diet controlled so far. Borderline elevated blood pressures. Getting echo to review LV function, rule out any significant structural/valvular heart disease. Iron deficiency anemia due to chronic blood loss  -Remains on iron supplementation. States she had an EGD and colonoscopy which showed no significant source for bleeding. PVC's (premature ventricular contractions)  -     Transthoracic echocardiogram (TTE) complete with contrast, bubble, strain, and 3D PRN;  Future   -Ordered echo to rule out significant underlying structural heart disease. Stress appears to bring them on. Not really aggravated by caffeine intake. Overall Impression  -Symptoms are likely associated with PVCs. Getting echocardiogram to rule out significant structural heart disease especially with borderline elevated blood pressures. Important to monitor blood pressures at home, avoid any excessive stress. Return in about 4 weeks (around 9/19/2022) for pvcs, palpitations, htn. Thank you for allowing us to participate in the care of your patient. If you have any further questions, please do not hesitate to contact us.   Sincerely,        Attila Landis MD   8/22/2022

## 2022-10-10 ENCOUNTER — TELEPHONE (OUTPATIENT)
Dept: CARDIOLOGY CLINIC | Age: 57
End: 2022-10-10

## 2022-10-10 NOTE — TELEPHONE ENCOUNTER
----- Message from Anthony Hinkle MD sent at 10/7/2022 12:05 PM EDT -----  Please let her know that her echocardiogram results were reviewed and it showed normal pumping ability of the heart but there was thickening of the heart muscle walls indicating hypertension. There was some mild leakage through one of the valves and mild enlargement of the left upper chamber which will come from insufficiently controlled blood pressures. I would recommend she start a low-dose of Valsartan/hydrochlorothiazide 160/12.5 mg once daily-she can start by taking half a pill daily for the first week and after she gets used to this, increase it to a full pill daily as long as blood pressures are above 135/85 mmHg. If her pressures do very well with just half a pill daily and remain below 135/85, she can stick with half a pill daily but dietary measures will be insufficient based on the findings from her echo.   Thanks,  AD  Thanks,  AD

## 2022-10-11 NOTE — TELEPHONE ENCOUNTER
Spoke with patient regarding the following per Dr. Evonne Petersen:  her echocardiogram results were reviewed and it showed normal pumping ability of the heart but there was thickening of the heart muscle walls indicating hypertension. There was some mild leakage through one of the valves and mild enlargement of the left upper chamber which will come from insufficiently controlled blood pressures. I would recommend she start a low-dose of Valsartan/hydrochlorothiazide 160/12.5 mg once daily-she can start by taking half a pill daily for the first week and after she gets used to this, increase it to a full pill daily as long as blood pressures are above 135/85 mmHg. If her pressures do very well with just half a pill daily and remain below 135/85, she can stick with half a pill daily but dietary measures will be insufficient based on the findings from her echo. Patient does not want to start blood pressure medication as she states her blood pressures have not been elevated. I requested patient keep a log of her blood pressure and heart rates twice daily and to bring them with her to her upcoming appointment in December. Pt is also concerned about the leakage around the valve and is asking for a clearer explanation for the leakage.       Thanks

## 2022-12-06 ENCOUNTER — OFFICE VISIT (OUTPATIENT)
Dept: CARDIOLOGY CLINIC | Age: 57
End: 2022-12-06
Payer: COMMERCIAL

## 2022-12-06 VITALS
WEIGHT: 216 LBS | SYSTOLIC BLOOD PRESSURE: 136 MMHG | DIASTOLIC BLOOD PRESSURE: 78 MMHG | BODY MASS INDEX: 35.99 KG/M2 | HEIGHT: 65 IN | HEART RATE: 72 BPM

## 2022-12-06 DIAGNOSIS — I49.3 PVC'S (PREMATURE VENTRICULAR CONTRACTIONS): Primary | ICD-10-CM

## 2022-12-06 DIAGNOSIS — I10 ESSENTIAL HYPERTENSION: ICD-10-CM

## 2022-12-06 DIAGNOSIS — R00.2 HEART PALPITATIONS: ICD-10-CM

## 2022-12-06 PROCEDURE — 3074F SYST BP LT 130 MM HG: CPT | Performed by: INTERNAL MEDICINE

## 2022-12-06 PROCEDURE — 3078F DIAST BP <80 MM HG: CPT | Performed by: INTERNAL MEDICINE

## 2022-12-06 PROCEDURE — 99214 OFFICE O/P EST MOD 30 MIN: CPT | Performed by: INTERNAL MEDICINE

## 2022-12-06 ASSESSMENT — ENCOUNTER SYMPTOMS
WHEEZING: 0
ABDOMINAL PAIN: 0
HEMOPTYSIS: 0
HOARSE VOICE: 0
HEMATOCHEZIA: 0
STRIDOR: 0
EYE REDNESS: 0
HEMATEMESIS: 0
DOUBLE VISION: 0

## 2022-12-06 NOTE — PROGRESS NOTES
[address] [address] Matt Perry  [address]  PHONE: [phone]          22    NAME:  Flip Bañuelos  : 1965  MRN: [id number]         SUBJECTIVE:   Flip Bañuelos is a 59 y.o. female seen for a visit regarding the following:     Chief Complaint   Patient presents with    Results    Hypertension           HPI:    Cardio problem list:  1. Palpitations  -3-day Holter monitor from 2022 showed sinus rhythm with an average heart rate at 71 bpm, rare PACs and PVCs  2.  Borderline hypertension  -Echo from 10/3/2022 showed an EF of 55 to 60% with no regional wall motion abnormalities, mild concentric LVH, mildly dilated left atrium with an RVSP 27  3. Iron deficiency anemia  4. Hyperlipidemia-diet controlled    Dear Dr. Fortino Mittal,  I saw Ms. Singer who is a pleasant 17-year-old woman in cardiovascular follow-up for palpitations with borderline hypertension and diet-controlled hyperlipidemia. Background: She went to the ER on 7/15/2022 with palpitations and her baseline work-up came back fairly normal apart from occasional PVCs. Palpitations/PVCs-she has had palpitations off and on now for a few months-overall better since we last met with her. Stress can sometimes bring it on. She now has an apple watch and monitors it closely. I went over all the results of her readings from her apple watch and it only shows sinus rhythm with rare PACs. Hypertension: Borderline elevated pressures. Denies headaches or blurry vision. No formal diagnosis of hypertension in the past.  Denies significant lower extremity edema orthopnea PND. Hyperlipidemia-diet controlled for now. Iron deficiency anemia-has been worked up by GI with no obvious etiology. Still following with Dr. Sumaya Carney still on this. On iron supplementation. No complaints of any anginal chest discomfort in any way. No TIAs or strokelike symptoms.     Past Medical History, Past Surgical History, Family history, Social History, and Medications were all reviewed with the patient today and updated as necessary. No Known Allergies  Patient Active Problem List   Diagnosis    Myopia    Iron deficiency anemia    Vitamin D deficiency    Heart palpitations    Pica in adults    Mixed hyperlipidemia    Encounter for long-term (current) use of medications    Establishing care with new doctor, encounter for    Essential hypertension    Acute pain of left knee    History of COVID-19    Regular astigmatism    COVID-19    Exposure to confirmed case of COVID-19    Allison-menopause    Cough, persistent    Iron deficiency anemia due to chronic blood loss    Presbyopia    Primary insomnia    Basal cell carcinoma of nose    Seasonal allergic rhinitis due to pollen     Past Medical History:   Diagnosis Date    Cervical polyp     Skin cancer      Past Surgical History:   Procedure Laterality Date    OTHER SURGICAL HISTORY Left 09/27/2018    Skin Cancer Removal (Left Arm)    OTHER SURGICAL HISTORY      skin cancer removed from arm and face 2005    TUBAL LIGATION       Family History   Problem Relation Age of Onset    Lung Disease Mother     Kidney Disease Father     Heart Disease Mother      Social History     Tobacco Use    Smoking status: Never    Smokeless tobacco: Never   Substance Use Topics    Alcohol use: No     Current Outpatient Medications   Medication Sig Dispense Refill    fluticasone (FLONASE) 50 MCG/ACT nasal spray 2 sprays by Nasal route daily 16 g 5    ergocalciferol (ERGOCALCIFEROL) 1.25 MG (56978 UT) capsule Take 1 capsule by mouth every 7 days 4 capsule 5    iron polysaccharides (NIFEREX) 150 MG capsule Take 1 capsule by mouth daily 60 capsule 5    melatonin 5 MG TABS tablet Take 5 mg by mouth       No current facility-administered medications for this visit. Review of Systems   Constitutional: Negative for chills and fever. HENT:  Negative for ear discharge, hoarse voice and stridor.     Eyes:  Negative for double vision and redness. Cardiovascular:  Positive for palpitations. Negative for cyanosis and syncope. Respiratory:  Negative for hemoptysis and wheezing. Endocrine: Negative for polydipsia and polyphagia. Hematologic/Lymphatic: Negative for adenopathy. Skin:  Negative for itching and rash. Musculoskeletal:  Negative for joint swelling and muscle weakness. Gastrointestinal:  Negative for abdominal pain, hematemesis and hematochezia. Genitourinary:  Negative for flank pain and nocturia. Neurological:  Negative for focal weakness and seizures. Psychiatric/Behavioral:  Negative for altered mental status and suicidal ideas. Allergic/Immunologic: Negative for hives. PHYSICAL EXAM:    /78   Pulse 72   Ht 5' 5\" (1.651 m)   Wt 216 lb (98 kg)   BMI 35.94 kg/m²      Physical Exam    General: Alert and oriented in no acute distress  HEENT: Head is normocephalic, atraumatic, pupils are equal bilaterally, throat appears to be clear  Neck: No significant jugular venous distention no cervical bruits  Cardiovascular: S1 and S2 heard, regular rate and rhythm, no significant murmurs rubs or gallops. Respiratory: Clear to auscultation bilaterally with no adventitious sounds, respirations are normal  Abdomen: Soft, nontender, nondistended, bowel sounds present. Extremities: No cyanosis clubbing or edema  Peripheral pulses: Bilateral radial artery pulses are palpated. Bilateral pedal pulses are well felt. Neuro: No facial droop and no gross focal motor deficits  Lymphatic: No significant cervical lymphadenopathy noted. Musculoskeletal: No significant redness or swelling noted in all exposed joints. Skin: No significant rashes noted the of the exposed regions. Medical problems and test results were reviewed with the patient today. No results found for this or any previous visit (from the past 672 hour(s)).   Lab Results   Component Value Date/Time    CHOL 171 06/13/2022 11:43 AM HDL 58 06/13/2022 11:43 AM   ,hemoglobin, basic metabolic panel, No results found for: TSH, TSH2, TSH3 ,  Lab Results   Component Value Date/Time     07/15/2022 02:12 PM    K 4.1 07/15/2022 02:12 PM     07/15/2022 02:12 PM    CO2 30 07/15/2022 02:12 PM    BUN 18 07/15/2022 02:12 PM    GFRAA >60 07/15/2022 02:12 PM    GLOB 3.9 07/15/2022 02:12 PM    ALT 19 07/15/2022 02:12 PM    AST 18 07/15/2022 02:12 PM      Lab Results   Component Value Date    LDLCALC 94.2 06/13/2022      Lab Results   Component Value Date    CREATININE 0.70 07/15/2022      Lab Results   Component Value Date/Time    MG 2.1 07/15/2022 02:12 PM      No results found for this or any previous visit. EKG from 7/15/2022 showed sinus rhythm at 65 bpm, normal QTC at 411 ms, normal NM interval of 154 ms    ASSESSMENT and PLAN      Heart palpitations  Only occasional PVCs noted on Holter monitor that she wore for 3 days with an average heart rate at 71 bpm.  No strong reason to treat it at this point especially with the lack of structural heart disease. Essential hypertension  -Has only been diet controlled so far. Borderline elevated blood pressures. Echo shows borderline LVH-continue to monitor-lifestyle measures for now. Iron deficiency anemia due to chronic blood loss  -Remains on iron supplementation. States she had an EGD and colonoscopy which showed no significant source for bleeding. PVC's (premature ventricular contractions)  -     -Overall not on that, and an not too frequent based on Holter monitor results. Echo showed no significant structural heart disease apart from some mild left atrial enlargement and borderline LVH. Overall Impression  -Symptoms are likely associated with PVCs.   Echo showed preserved LV function and no significant regional wall motion abnormalities, mild left atrial enlargement and borderline LVH-have asked her to monitor blood pressures at home especially since her readings were borderline elevated when she first came in. If her average pressures are above 135/85, she could benefit from either low-dose diltiazem to help with palpitations/PVCs or even low-dose ARB therapy purely for blood pressures. Lipids have improved when last checked. Routine cardiovascular exercise/lifestyle measures were encouraged. I will see him in follow-up in 1 years time. Return in about 1 year (around 12/6/2023) for PACs, PVCs, htn. Thank you for allowing us to participate in the care of your patient. If you have any further questions, please do not hesitate to contact us.   Sincerely,        Kimberley Salvador MD   12/6/2022

## 2022-12-20 ENCOUNTER — OFFICE VISIT (OUTPATIENT)
Dept: PRIMARY CARE CLINIC | Facility: CLINIC | Age: 57
End: 2022-12-20
Payer: COMMERCIAL

## 2022-12-20 VITALS
DIASTOLIC BLOOD PRESSURE: 74 MMHG | HEART RATE: 55 BPM | BODY MASS INDEX: 36.27 KG/M2 | HEIGHT: 65 IN | SYSTOLIC BLOOD PRESSURE: 158 MMHG | TEMPERATURE: 97.4 F | OXYGEN SATURATION: 100 % | WEIGHT: 217.7 LBS

## 2022-12-20 DIAGNOSIS — J30.1 SEASONAL ALLERGIC RHINITIS DUE TO POLLEN: ICD-10-CM

## 2022-12-20 DIAGNOSIS — E55.9 VITAMIN D DEFICIENCY: ICD-10-CM

## 2022-12-20 DIAGNOSIS — E66.01 SEVERE OBESITY (BMI 35.0-39.9) WITH COMORBIDITY (HCC): Primary | ICD-10-CM

## 2022-12-20 DIAGNOSIS — Z79.899 ENCOUNTER FOR LONG-TERM (CURRENT) USE OF MEDICATIONS: ICD-10-CM

## 2022-12-20 DIAGNOSIS — D50.8 OTHER IRON DEFICIENCY ANEMIA: ICD-10-CM

## 2022-12-20 DIAGNOSIS — Z13.220 SCREENING FOR LIPID DISORDERS: ICD-10-CM

## 2022-12-20 DIAGNOSIS — R25.2 LEG CRAMPS: ICD-10-CM

## 2022-12-20 DIAGNOSIS — R03.0 ELEVATED BLOOD PRESSURE READING: ICD-10-CM

## 2022-12-20 LAB
BASOPHILS # BLD: 0 K/UL (ref 0–0.2)
BASOPHILS NFR BLD: 1 % (ref 0–2)
DIFFERENTIAL METHOD BLD: ABNORMAL
EOSINOPHIL # BLD: 0.2 K/UL (ref 0–0.8)
EOSINOPHIL NFR BLD: 3 % (ref 0.5–7.8)
ERYTHROCYTE [DISTWIDTH] IN BLOOD BY AUTOMATED COUNT: 13.5 % (ref 11.9–14.6)
HCT VFR BLD AUTO: 39.1 % (ref 35.8–46.3)
HGB BLD-MCNC: 12 G/DL (ref 11.7–15.4)
IMM GRANULOCYTES # BLD AUTO: 0 K/UL (ref 0–0.5)
IMM GRANULOCYTES NFR BLD AUTO: 0 % (ref 0–5)
LYMPHOCYTES # BLD: 2.1 K/UL (ref 0.5–4.6)
LYMPHOCYTES NFR BLD: 38 % (ref 13–44)
MCH RBC QN AUTO: 29.4 PG (ref 26.1–32.9)
MCHC RBC AUTO-ENTMCNC: 30.7 G/DL (ref 31.4–35)
MCV RBC AUTO: 95.8 FL (ref 82–102)
MONOCYTES # BLD: 0.4 K/UL (ref 0.1–1.3)
MONOCYTES NFR BLD: 7 % (ref 4–12)
NEUTS SEG # BLD: 2.9 K/UL (ref 1.7–8.2)
NEUTS SEG NFR BLD: 52 % (ref 43–78)
NRBC # BLD: 0 K/UL (ref 0–0.2)
PLATELET # BLD AUTO: 271 K/UL (ref 150–450)
PMV BLD AUTO: 10.6 FL (ref 9.4–12.3)
RBC # BLD AUTO: 4.08 M/UL (ref 4.05–5.2)
WBC # BLD AUTO: 5.6 K/UL (ref 4.3–11.1)

## 2022-12-20 PROCEDURE — 3074F SYST BP LT 130 MM HG: CPT | Performed by: FAMILY MEDICINE

## 2022-12-20 PROCEDURE — 99214 OFFICE O/P EST MOD 30 MIN: CPT | Performed by: FAMILY MEDICINE

## 2022-12-20 PROCEDURE — 3078F DIAST BP <80 MM HG: CPT | Performed by: FAMILY MEDICINE

## 2022-12-20 RX ORDER — ERGOCALCIFEROL 1.25 MG/1
50000 CAPSULE ORAL
Qty: 4 CAPSULE | Refills: 5 | Status: SHIPPED | OUTPATIENT
Start: 2022-12-20

## 2022-12-20 RX ORDER — IRON POLYSACCHARIDE COMPLEX 150 MG
150 CAPSULE ORAL DAILY
Qty: 60 CAPSULE | Refills: 5 | Status: SHIPPED | OUTPATIENT
Start: 2022-12-20

## 2022-12-20 RX ORDER — FLUTICASONE PROPIONATE 50 MCG
2 SPRAY, SUSPENSION (ML) NASAL DAILY
Qty: 16 G | Refills: 5 | Status: SHIPPED | OUTPATIENT
Start: 2022-12-20

## 2022-12-20 ASSESSMENT — PATIENT HEALTH QUESTIONNAIRE - PHQ9
1. LITTLE INTEREST OR PLEASURE IN DOING THINGS: 0
SUM OF ALL RESPONSES TO PHQ QUESTIONS 1-9: 0
2. FEELING DOWN, DEPRESSED OR HOPELESS: 0
SUM OF ALL RESPONSES TO PHQ9 QUESTIONS 1 & 2: 0

## 2022-12-20 NOTE — PROGRESS NOTES
Idania Hernandez M.D.  [facility]  Phone:  [phone]  Fax:  [phone]    CHIEF COMPLAINT:  Chief Complaint   Patient presents with    Follow-up     Pt returns for follow up. She has been doing well. She has been having some palpitations and mildly elevated blood pressure readings, seeing cardiology. He told her they would just continue to watch her blood pressure. HISTORY OF PRESENT ILLNESS:  Ms. Malachi Sanz is a 59 y.o. female  who presents for follow up. Her blood pressure is elevated this morning at 158/74. She recently saw the cardiologist. She has been having some flutters and palpitations. She wore a Holter monitor in July. It revealed occasional PVCs. Her cardiologist has suggested Diltiazem for hypertension and for the PVCs or an ARB just for the hypertension. She does not want to take any blood pressure medication at this time. She thinks it is stress related and wants to try to control her blood pressure without medications. No other complaints. Taking medications as prescribed. Medications reviewed and updated. HISTORY:  No Known Allergies      REVIEW OF SYSTEMS:  Review of systems is as indicated in HPI otherwise negative. PHYSICAL EXAM:  Vital Signs -   Visit Vitals  BP (!) 158/74   Pulse 55   Temp 97.4 °F (36.3 °C) (Temporal)   Ht 5' 5\" (1.651 m)   Wt 217 lb 11.2 oz (98.7 kg)   SpO2 100%   BMI 36.23 kg/m²        Physical Exam  Vitals and nursing note reviewed. Constitutional:       Appearance: Normal appearance. HENT:      Head: Normocephalic and atraumatic. Nose: Nose normal.      Mouth/Throat:      Mouth: Mucous membranes are moist.   Eyes:      Extraocular Movements: Extraocular movements intact. Pupils: Pupils are equal, round, and reactive to light. Cardiovascular:      Rate and Rhythm: Normal rate and regular rhythm. Pulses: Normal pulses.    Pulmonary:      Effort: Pulmonary effort is normal. Breath sounds: Normal breath sounds. Abdominal:      General: Abdomen is flat. Palpations: Abdomen is soft. Musculoskeletal:         General: Normal range of motion. Cervical back: Normal range of motion and neck supple. Skin:     General: Skin is warm and dry. Neurological:      Mental Status: She is alert. Psychiatric:         Mood and Affect: Mood normal.         Behavior: Behavior normal.         PHQ:  PHQ-9  12/20/2022   Little interest or pleasure in doing things 0   Little interest or pleasure in doing things -   Feeling down, depressed, or hopeless 0   PHQ-2 Score 0   Total Score PHQ 2 -   PHQ-9 Total Score 0       LABS  No results found for this visit on 12/20/22.   Ancillary Procedure on 10/03/2022   Component Date Value Ref Range Status    LV EDV A2C 10/03/2022 141  mL Final    LV EDV A4C 10/03/2022 97  mL Final    LV ESV A2C 10/03/2022 36  mL Final    LV ESV A4C 10/03/2022 46  mL Final    IVSd 10/03/2022 1.1 (A)  0.6 - 0.9 cm Final    LVIDd 10/03/2022 4.5  3.9 - 5.3 cm Final    LVIDs 10/03/2022 3.2  cm Final    LVOT Diameter 10/03/2022 2.0  cm Final    LVOT Mean Gradient 10/03/2022 1  mmHg Final    LVOT VTI 10/03/2022 19.5  cm Final    LVPWd 10/03/2022 1.1 (A)  0.6 - 0.9 cm Final    LV E' Lateral Velocity 10/03/2022 9  cm/s Final    LV E' Septal Velocity 10/03/2022 5  cm/s Final    LV Ejection Fraction A2C 10/03/2022 74  % Final    LV Ejection Fraction A4C 10/03/2022 53  % Final    EF BP 10/03/2022 65  55 - 100 % Final    LVOT Area 10/03/2022 3.1  cm2 Final    LVOT SV 10/03/2022 61.2  ml Final    LA Minor Axis 10/03/2022 5.4  cm Final    LA Major Axis 10/03/2022 5.5  cm Final    LA Area 2C 10/03/2022 27.7  cm2 Final    LA Area 4C 10/03/2022 21.8  cm2 Final    LA Volume BP 10/03/2022 90 (A)  22 - 52 mL Final    LA Diameter 10/03/2022 3.4  cm Final    AV Mean Gradient 10/03/2022 5  mmHg Final    AV VTI 10/03/2022 37.7  cm Final    AV Mean Velocity 10/03/2022 1.1  m/s Final    AV Area by VTI 10/03/2022 1.6  cm2 Final    Aortic Root 10/03/2022 3.8  cm Final    MV E Wave Deceleration Time 10/03/2022 239.0  ms Final    MV A Velocity 10/03/2022 0.80  m/s Final    MV E Velocity 10/03/2022 0.64  m/s Final    RVIDd 10/03/2022 2.5  cm Final    TR Max Velocity 10/03/2022 2.46  m/s Final    TR Peak Gradient 10/03/2022 24  mmHg Final    Body Surface Area 10/03/2022 2.11  m2 Final    Fractional Shortening 2D 10/03/2022 29  28 - 44 % Final    LV ESV Index A4C 10/03/2022 23  mL/m2 Final    LV EDV Index A4C 10/03/2022 48  mL/m2 Final    LV ESV Index A2C 10/03/2022 18  mL/m2 Final    LV EDV Index A2C 10/03/2022 69  mL/m2 Final    LVIDd Index 10/03/2022 2.21  cm/m2 Final    LVIDs Index 10/03/2022 1.57  cm/m2 Final    LV RWT Ratio 10/03/2022 0.49   Final    LV Mass 2D 10/03/2022 175.0 (A)  67 - 162 g Final    LV Mass 2D Index 10/03/2022 85.8  43 - 95 g/m2 Final    MV E/A 10/03/2022 0.80   Final    E/E' Ratio (Averaged) 10/03/2022 9.96   Final    E/E' Lateral 10/03/2022 7.11   Final    E/E' Septal 10/03/2022 12.80   Final    LA Volume Index BP 10/03/2022 44 (A)  16 - 34 ml/m2 Final    LVOT Stroke Volume Index 10/03/2022 30.0  mL/m2 Final    LA Size Index 10/03/2022 1.67  cm/m2 Final    LA/AO Root Ratio 10/03/2022 0.89   Final    Ao Root Index 10/03/2022 1.86  cm/m2 Final    LVOT:AV VTI Index 10/03/2022 0.52   Final    EMRE/BSA VTI 10/03/2022 0.8  cm2/m2 Final    RV Basal Dimension 10/03/2022 2.7  cm Final    RV Mid Dimension 10/03/2022 2.6  cm Final    TAPSE 10/03/2022 2.7  1.7 cm Final    Est. RA Pressure 10/03/2022 3  mmHg Final    RVSP 10/03/2022 27  mmHg Final    Left Ventricular Ejection Fraction 10/03/2022 58   Final-Edited    LVEF MODALITY 10/03/2022 ECHO   Final-Edited       IMPRESSION/PLAN     Diagnosis Orders   1. Severe obesity (BMI 35.0-39. 9) with comorbidity ([address]        2. Elevated blood pressure reading  CBC with Auto Differential    Comprehensive Metabolic Panel      3.  Leg cramps  Magnesium Comprehensive Metabolic Panel      4. Other iron deficiency anemia  Iron    Ferritin    iron polysaccharides (NIFEREX) 150 MG capsule      5. Vitamin D deficiency  Vitamin D 25 Hydroxy    ergocalciferol (ERGOCALCIFEROL) 1.25 MG (07421 UT) capsule      6. Seasonal allergic rhinitis due to pollen  fluticasone (FLONASE) 50 MCG/ACT nasal spray      7. Screening for lipid disorders  Lipid Panel      8. Encounter for long-term (current) use of medications  CBC with Auto Differential    Comprehensive Metabolic Panel          Follow up and Dispositions:  Return in about 6 months (around 6/20/2023). Patient will continue current medications. Refilled the above medications. Reviewed medications and side effects in detail. Will check the above labs. Reviewed most recent labs. Reviewed diet, exercise and weight control. Cardiovascular risks and recommendations reviewed. Patient encouraged to follow a low sodium diet. Use of aspirin to prevent MIs and TIAs discussed. Gemma Todd MD    Dictated using voice recognition software.  Proofread, but unrecognized voice recognition errors may exist.

## 2022-12-21 LAB
25(OH)D3 SERPL-MCNC: 61.2 NG/ML (ref 30–100)
ALBUMIN SERPL-MCNC: 3.6 G/DL (ref 3.5–5)
ALBUMIN/GLOB SERPL: 1.1 {RATIO} (ref 0.4–1.6)
ALP SERPL-CCNC: 94 U/L (ref 50–136)
ALT SERPL-CCNC: 22 U/L (ref 12–65)
ANION GAP SERPL CALC-SCNC: 6 MMOL/L (ref 2–11)
AST SERPL-CCNC: 19 U/L (ref 15–37)
BILIRUB SERPL-MCNC: 0.3 MG/DL (ref 0.2–1.1)
BUN SERPL-MCNC: 14 MG/DL (ref 6–23)
CALCIUM SERPL-MCNC: 8.9 MG/DL (ref 8.3–10.4)
CHLORIDE SERPL-SCNC: 108 MMOL/L (ref 101–110)
CHOLEST SERPL-MCNC: 213 MG/DL
CO2 SERPL-SCNC: 27 MMOL/L (ref 21–32)
CREAT SERPL-MCNC: 0.7 MG/DL (ref 0.6–1)
FERRITIN SERPL-MCNC: 15 NG/ML (ref 8–388)
GLOBULIN SER CALC-MCNC: 3.2 G/DL (ref 2.8–4.5)
GLUCOSE SERPL-MCNC: 84 MG/DL (ref 65–100)
HDLC SERPL-MCNC: 56 MG/DL (ref 40–60)
HDLC SERPL: 3.8 {RATIO}
IRON SERPL-MCNC: 64 UG/DL (ref 35–150)
LDLC SERPL CALC-MCNC: 138 MG/DL
MAGNESIUM SERPL-MCNC: 2.1 MG/DL (ref 1.8–2.4)
POTASSIUM SERPL-SCNC: 4.5 MMOL/L (ref 3.5–5.1)
PROT SERPL-MCNC: 6.8 G/DL (ref 6.3–8.2)
SODIUM SERPL-SCNC: 141 MMOL/L (ref 133–143)
TRIGL SERPL-MCNC: 95 MG/DL (ref 35–150)
VLDLC SERPL CALC-MCNC: 19 MG/DL (ref 6–23)

## 2022-12-28 NOTE — RESULT ENCOUNTER NOTE
1. Cholesterol: 213; LDL: 138 - will continue to monitor. Remainder of her labs are good. Patient notified via [address]

## 2023-06-20 ENCOUNTER — OFFICE VISIT (OUTPATIENT)
Dept: PRIMARY CARE CLINIC | Facility: CLINIC | Age: 58
End: 2023-06-20
Payer: COMMERCIAL

## 2023-06-20 VITALS
WEIGHT: 225.7 LBS | HEIGHT: 65 IN | SYSTOLIC BLOOD PRESSURE: 134 MMHG | BODY MASS INDEX: 37.61 KG/M2 | DIASTOLIC BLOOD PRESSURE: 71 MMHG | OXYGEN SATURATION: 97 % | HEART RATE: 61 BPM | TEMPERATURE: 97.7 F

## 2023-06-20 DIAGNOSIS — D50.9 IRON DEFICIENCY ANEMIA, UNSPECIFIED IRON DEFICIENCY ANEMIA TYPE: ICD-10-CM

## 2023-06-20 DIAGNOSIS — I10 ESSENTIAL HYPERTENSION: Primary | ICD-10-CM

## 2023-06-20 DIAGNOSIS — Z13.220 SCREENING FOR LIPID DISORDERS: ICD-10-CM

## 2023-06-20 DIAGNOSIS — E66.9 OBESITY (BMI 30-39.9): ICD-10-CM

## 2023-06-20 DIAGNOSIS — R73.01 IMPAIRED FASTING GLUCOSE: ICD-10-CM

## 2023-06-20 DIAGNOSIS — E55.9 VITAMIN D DEFICIENCY: ICD-10-CM

## 2023-06-20 DIAGNOSIS — E78.2 MIXED HYPERLIPIDEMIA: ICD-10-CM

## 2023-06-20 DIAGNOSIS — Z79.899 ENCOUNTER FOR LONG-TERM (CURRENT) USE OF MEDICATIONS: ICD-10-CM

## 2023-06-20 LAB
ALBUMIN SERPL-MCNC: 3.4 G/DL (ref 3.5–5)
ALBUMIN/GLOB SERPL: 0.9 (ref 0.4–1.6)
ALP SERPL-CCNC: 83 U/L (ref 50–136)
ALT SERPL-CCNC: 21 U/L (ref 12–65)
ANION GAP SERPL CALC-SCNC: 4 MMOL/L (ref 2–11)
AST SERPL-CCNC: 19 U/L (ref 15–37)
BASOPHILS # BLD: 0 K/UL (ref 0–0.2)
BASOPHILS NFR BLD: 1 % (ref 0–2)
BILIRUB SERPL-MCNC: 0.3 MG/DL (ref 0.2–1.1)
BUN SERPL-MCNC: 16 MG/DL (ref 6–23)
CALCIUM SERPL-MCNC: 8.7 MG/DL (ref 8.3–10.4)
CHLORIDE SERPL-SCNC: 109 MMOL/L (ref 101–110)
CO2 SERPL-SCNC: 25 MMOL/L (ref 21–32)
CREAT SERPL-MCNC: 0.7 MG/DL (ref 0.6–1)
DIFFERENTIAL METHOD BLD: ABNORMAL
EOSINOPHIL # BLD: 0.2 K/UL (ref 0–0.8)
EOSINOPHIL NFR BLD: 3 % (ref 0.5–7.8)
ERYTHROCYTE [DISTWIDTH] IN BLOOD BY AUTOMATED COUNT: 14.2 % (ref 11.9–14.6)
FERRITIN SERPL-MCNC: 11 NG/ML (ref 8–388)
GLOBULIN SER CALC-MCNC: 3.8 G/DL (ref 2.8–4.5)
GLUCOSE SERPL-MCNC: 79 MG/DL (ref 65–100)
HCT VFR BLD AUTO: 38 % (ref 35.8–46.3)
HGB BLD-MCNC: 11.8 G/DL (ref 11.7–15.4)
IMM GRANULOCYTES # BLD AUTO: 0 K/UL (ref 0–0.5)
IMM GRANULOCYTES NFR BLD AUTO: 0 % (ref 0–5)
IRON SERPL-MCNC: 56 UG/DL (ref 35–150)
LYMPHOCYTES # BLD: 1.7 K/UL (ref 0.5–4.6)
LYMPHOCYTES NFR BLD: 32 % (ref 13–44)
MCH RBC QN AUTO: 29.6 PG (ref 26.1–32.9)
MCHC RBC AUTO-ENTMCNC: 31.1 G/DL (ref 31.4–35)
MCV RBC AUTO: 95.2 FL (ref 82–102)
MONOCYTES # BLD: 0.4 K/UL (ref 0.1–1.3)
MONOCYTES NFR BLD: 7 % (ref 4–12)
NEUTS SEG # BLD: 3 K/UL (ref 1.7–8.2)
NEUTS SEG NFR BLD: 57 % (ref 43–78)
NRBC # BLD: 0 K/UL (ref 0–0.2)
PLATELET # BLD AUTO: 237 K/UL (ref 150–450)
PMV BLD AUTO: 10.9 FL (ref 9.4–12.3)
POTASSIUM SERPL-SCNC: 4.3 MMOL/L (ref 3.5–5.1)
PROT SERPL-MCNC: 7.2 G/DL (ref 6.3–8.2)
RBC # BLD AUTO: 3.99 M/UL (ref 4.05–5.2)
SODIUM SERPL-SCNC: 138 MMOL/L (ref 133–143)
WBC # BLD AUTO: 5.2 K/UL (ref 4.3–11.1)

## 2023-06-20 PROCEDURE — 3078F DIAST BP <80 MM HG: CPT | Performed by: FAMILY MEDICINE

## 2023-06-20 PROCEDURE — 99214 OFFICE O/P EST MOD 30 MIN: CPT | Performed by: FAMILY MEDICINE

## 2023-06-20 PROCEDURE — 3074F SYST BP LT 130 MM HG: CPT | Performed by: FAMILY MEDICINE

## 2023-06-20 RX ORDER — TIRZEPATIDE 5 MG/.5ML
5 INJECTION, SOLUTION SUBCUTANEOUS WEEKLY
Qty: 4 ADJUSTABLE DOSE PRE-FILLED PEN SYRINGE | Refills: 5 | Status: SHIPPED | OUTPATIENT
Start: 2023-06-20

## 2023-06-20 SDOH — ECONOMIC STABILITY: FOOD INSECURITY: WITHIN THE PAST 12 MONTHS, THE FOOD YOU BOUGHT JUST DIDN'T LAST AND YOU DIDN'T HAVE MONEY TO GET MORE.: NEVER TRUE

## 2023-06-20 SDOH — ECONOMIC STABILITY: HOUSING INSECURITY
IN THE LAST 12 MONTHS, WAS THERE A TIME WHEN YOU DID NOT HAVE A STEADY PLACE TO SLEEP OR SLEPT IN A SHELTER (INCLUDING NOW)?: NO

## 2023-06-20 SDOH — ECONOMIC STABILITY: INCOME INSECURITY: HOW HARD IS IT FOR YOU TO PAY FOR THE VERY BASICS LIKE FOOD, HOUSING, MEDICAL CARE, AND HEATING?: NOT HARD AT ALL

## 2023-06-20 SDOH — ECONOMIC STABILITY: FOOD INSECURITY: WITHIN THE PAST 12 MONTHS, YOU WORRIED THAT YOUR FOOD WOULD RUN OUT BEFORE YOU GOT MONEY TO BUY MORE.: NEVER TRUE

## 2023-06-20 ASSESSMENT — PATIENT HEALTH QUESTIONNAIRE - PHQ9
SUM OF ALL RESPONSES TO PHQ QUESTIONS 1-9: 0
SUM OF ALL RESPONSES TO PHQ QUESTIONS 1-9: 0
1. LITTLE INTEREST OR PLEASURE IN DOING THINGS: 0
SUM OF ALL RESPONSES TO PHQ QUESTIONS 1-9: 0
SUM OF ALL RESPONSES TO PHQ QUESTIONS 1-9: 0

## 2023-06-20 NOTE — PROGRESS NOTES
Drew Elias M.D.  [facility]  [place]  Phone:  [phone]  Fax:  [phone]:  Chief Complaint   Patient presents with    Follow-up     Patient is here for follow up, she states her insurance will not cover iron or flonase anymore. Obesity     Patient would like to discuss weight loss program. She has tried [facility] and they cause her to gain weight           HISTORY OF PRESENT ILLNESS:  Ms. Boston Sun is a 59 y.o. female  who presents for follow up. She has been doing well. She takes her medications daily as prescribed. She has been trying to lose weight. She would like to try [facility] for weight loss. No other complaints. Taking medications as prescribed. Medications reviewed and updated. HISTORY:  No Known Allergies      REVIEW OF SYSTEMS:  Review of systems is as indicated in HPI otherwise negative. PHYSICAL EXAM:  Vital Signs -   Visit Vitals  /71   Pulse 61   Temp 97.7 °F (36.5 °C) (Temporal)   Ht 5' 5\" (1.651 m)   Wt 225 lb 11.2 oz (102.4 kg)   SpO2 97%   BMI 37.56 kg/m²        Physical Exam  Vitals and nursing note reviewed. Constitutional:       Appearance: Normal appearance. HENT:      Head: Normocephalic and atraumatic. Nose: Nose normal.      Mouth/Throat:      Mouth: Mucous membranes are moist.   Eyes:      Extraocular Movements: Extraocular movements intact. Pupils: Pupils are equal, round, and reactive to light. Cardiovascular:      Rate and Rhythm: Normal rate and regular rhythm. Pulses: Normal pulses. Pulmonary:      Effort: Pulmonary effort is normal.      Breath sounds: Normal breath sounds. Abdominal:      General: Abdomen is flat. Palpations: Abdomen is soft. Musculoskeletal:         General: Normal range of motion. Cervical back: Normal range of motion and neck supple. Skin:     General: Skin is warm and dry. Neurological:      Mental Status: She is alert.

## 2023-06-21 ENCOUNTER — TELEPHONE (OUTPATIENT)
Dept: PRIMARY CARE CLINIC | Facility: CLINIC | Age: 58
End: 2023-06-21

## 2023-06-21 DIAGNOSIS — E66.9 OBESITY (BMI 30-39.9): Primary | ICD-10-CM

## 2023-06-21 LAB
25(OH)D3 SERPL-MCNC: 52.2 NG/ML (ref 30–100)
CHOLEST SERPL-MCNC: 210 MG/DL (ref 100–199)
EST. AVERAGE GLUCOSE BLD GHB EST-MCNC: 111 MG/DL
HBA1C MFR BLD: 5.5 % (ref 4.8–5.6)
HDL SERPL-SCNC: 37.1 UMOL/L
HDLC SERPL-MCNC: 55 MG/DL
LDL SERPL QN: 20.9 NM
LDL SERPL-SCNC: 1349 NMOL/L
LDL SMALL SERPL-SCNC: 711 NMOL/L
LDLC SERPL CALC-MCNC: 132 MG/DL (ref 0–99)
LP-IR SCORE SERPL: 37
TRIGL SERPL-MCNC: 127 MG/DL (ref 0–149)

## 2023-06-22 RX ORDER — SEMAGLUTIDE 1.34 MG/ML
0.5 INJECTION, SOLUTION SUBCUTANEOUS
Qty: 3 ADJUSTABLE DOSE PRE-FILLED PEN SYRINGE | Refills: 0 | Status: SHIPPED | OUTPATIENT
Start: 2023-06-22 | End: 2023-07-20

## 2023-06-22 RX ORDER — SEMAGLUTIDE 1.34 MG/ML
1 INJECTION, SOLUTION SUBCUTANEOUS WEEKLY
Qty: 3 ML | Refills: 5 | Status: SHIPPED | OUTPATIENT
Start: 2023-07-21

## 2023-06-23 DIAGNOSIS — E78.2 MIXED HYPERLIPIDEMIA: Primary | ICD-10-CM

## 2023-06-23 RX ORDER — ROSUVASTATIN CALCIUM 5 MG/1
5 TABLET, COATED ORAL NIGHTLY
Qty: 30 TABLET | Refills: 5 | Status: SHIPPED | OUTPATIENT
Start: 2023-06-23

## 2023-06-23 NOTE — RESULT ENCOUNTER NOTE
1. LDL particle number is elevated at 1349; LDL: elevated at 132; cholesterol: elevated at 210 - at increased risk of plaque build up in the arteries putting her at risk of a stroke or heart attack. Will start Crestor. Crestor has been shown to help reduce the plaque build up in the arteries. Will send prescription to the pharmacy. 2. Insulin resistance is 37 - does not have insulin resistance; HbA1c: 5.5 - does not have diabetes  Remainder of her labs are good.

## 2023-06-28 ENCOUNTER — TELEPHONE (OUTPATIENT)
Dept: PRIMARY CARE CLINIC | Facility: CLINIC | Age: 58
End: 2023-06-28

## 2023-07-05 NOTE — TELEPHONE ENCOUNTER
Your PA request has been denied. Additional information will be provided in the denial communication.  (Message [phone])

## 2023-07-06 ENCOUNTER — TELEPHONE (OUTPATIENT)
Dept: PRIMARY CARE CLINIC | Facility: CLINIC | Age: 58
End: 2023-07-06

## 2023-07-06 NOTE — TELEPHONE ENCOUNTER
Thought Tg Cordial was going to be sent in but ozempic was sent in instead. Please call patient back.

## 2023-07-06 NOTE — TELEPHONE ENCOUNTER
Left detailed message on patients voicemail that Arvind Swan was denied so Dr Duarte Sampson prescribed Ozempic which was also denied. The reason for denial is patient is not diabetic and weight loss medication is excluded from her insurance plan.

## 2023-08-05 DIAGNOSIS — E55.9 VITAMIN D DEFICIENCY: ICD-10-CM

## 2023-08-14 RX ORDER — ERGOCALCIFEROL 1.25 MG/1
CAPSULE ORAL
Qty: 12 CAPSULE | Refills: 1 | Status: SHIPPED | OUTPATIENT
Start: 2023-08-14

## 2023-10-23 ASSESSMENT — PATIENT HEALTH QUESTIONNAIRE - PHQ9
2. FEELING DOWN, DEPRESSED OR HOPELESS: NOT AT ALL
SUM OF ALL RESPONSES TO PHQ QUESTIONS 1-9: 0
SUM OF ALL RESPONSES TO PHQ9 QUESTIONS 1 & 2: 0
1. LITTLE INTEREST OR PLEASURE IN DOING THINGS: NOT AT ALL
SUM OF ALL RESPONSES TO PHQ QUESTIONS 1-9: 0

## 2023-12-13 ENCOUNTER — E-VISIT (OUTPATIENT)
Dept: PRIMARY CARE CLINIC | Facility: CLINIC | Age: 58
End: 2023-12-13
Payer: COMMERCIAL

## 2023-12-13 DIAGNOSIS — J40 BRONCHITIS: Primary | ICD-10-CM

## 2023-12-13 PROCEDURE — 99421 OL DIG E/M SVC 5-10 MIN: CPT | Performed by: FAMILY MEDICINE

## 2023-12-13 RX ORDER — HYDROCODONE BITARTRATE AND HOMATROPINE METHYLBROMIDE ORAL SOLUTION 5; 1.5 MG/5ML; MG/5ML
5 LIQUID ORAL EVERY 4 HOURS PRN
Qty: 120 ML | Refills: 0 | Status: SHIPPED | OUTPATIENT
Start: 2023-12-13 | End: 2023-12-27

## 2023-12-13 RX ORDER — METHYLPREDNISOLONE 4 MG/1
TABLET ORAL
Qty: 1 KIT | Refills: 0 | Status: SHIPPED | OUTPATIENT
Start: 2023-12-13

## 2023-12-13 RX ORDER — LEVOFLOXACIN 500 MG/1
500 TABLET, FILM COATED ORAL DAILY
Qty: 10 TABLET | Refills: 0 | Status: SHIPPED | OUTPATIENT
Start: 2023-12-13 | End: 2023-12-23

## 2023-12-13 ASSESSMENT — LIFESTYLE VARIABLES: SMOKING_STATUS: NO, I'VE NEVER SMOKED

## 2023-12-13 NOTE — PROGRESS NOTES
Lizeth Johnson (1965) initiated an asynchronous digital communication through [address] [place] [address]. HPI: per patient questionnaire  Exam: not applicable  Diagnoses and all orders for this visit:  Diagnoses and all orders for this visit:    Bronchitis  -     levoFLOXacin (LEVAQUIN) 500 MG tablet; Take 1 tablet by mouth daily for 10 days  -     methylPREDNISolone (MEDROL DOSEPACK) 4 MG tablet; Taper as directed  -     HYDROcodone homatropine (HYCODAN) 5-1.5 MG/5ML solution; Take 5 mLs by mouth every 4 hours as needed (cough) for up to 14 days. Max Daily Amount: 30 mLs          Time: EV1 - 5-10 minutes were spent on the digital evaluation and management of this patient.       Pasquale Sal MD

## 2024-02-19 ASSESSMENT — PATIENT HEALTH QUESTIONNAIRE - PHQ9
SUM OF ALL RESPONSES TO PHQ9 QUESTIONS 1 & 2: 0
1. LITTLE INTEREST OR PLEASURE IN DOING THINGS: NOT AT ALL
2. FEELING DOWN, DEPRESSED OR HOPELESS: NOT AT ALL

## 2024-03-25 ENCOUNTER — E-VISIT (OUTPATIENT)
Dept: PRIMARY CARE CLINIC | Facility: CLINIC | Age: 59
End: 2024-03-25
Payer: COMMERCIAL

## 2024-03-25 DIAGNOSIS — E55.9 VITAMIN D DEFICIENCY: ICD-10-CM

## 2024-03-25 DIAGNOSIS — J01.00 ACUTE NON-RECURRENT MAXILLARY SINUSITIS: Primary | ICD-10-CM

## 2024-03-25 DIAGNOSIS — D50.8 OTHER IRON DEFICIENCY ANEMIA: ICD-10-CM

## 2024-03-25 DIAGNOSIS — R05.8 PRODUCTIVE COUGH: ICD-10-CM

## 2024-03-25 PROCEDURE — 99421 OL DIG E/M SVC 5-10 MIN: CPT | Performed by: FAMILY MEDICINE

## 2024-03-25 RX ORDER — ERGOCALCIFEROL 1.25 MG/1
CAPSULE ORAL
Qty: 12 CAPSULE | Refills: 1 | Status: SHIPPED | OUTPATIENT
Start: 2024-03-25

## 2024-03-25 RX ORDER — AMOXICILLIN AND CLAVULANATE POTASSIUM 500; 125 MG/1; MG/1
1 TABLET, FILM COATED ORAL 2 TIMES DAILY
Qty: 20 TABLET | Refills: 0 | Status: SHIPPED | OUTPATIENT
Start: 2024-03-25 | End: 2024-04-04

## 2024-03-25 RX ORDER — IRON POLYSACCHARIDE COMPLEX 150 MG
CAPSULE ORAL DAILY
Qty: 90 CAPSULE | Refills: 3 | Status: SHIPPED | OUTPATIENT
Start: 2024-03-25

## 2024-03-25 RX ORDER — HYDROCODONE BITARTRATE AND HOMATROPINE METHYLBROMIDE ORAL SOLUTION 5; 1.5 MG/5ML; MG/5ML
5 LIQUID ORAL EVERY 4 HOURS PRN
Qty: 120 ML | Refills: 0 | Status: SHIPPED | OUTPATIENT
Start: 2024-03-25 | End: 2024-04-08

## 2024-03-25 RX ORDER — FLUCONAZOLE 150 MG/1
150 TABLET ORAL ONCE
Qty: 1 TABLET | Refills: 0 | Status: SHIPPED | OUTPATIENT
Start: 2024-03-25 | End: 2024-03-25

## 2024-03-25 ASSESSMENT — LIFESTYLE VARIABLES: SMOKING_STATUS: NO, I'VE NEVER SMOKED

## 2024-03-25 NOTE — PROGRESS NOTES
Denise Singer (1965) initiated an asynchronous digital communication through [employer].    HPI: per patient questionnaire  Exam: not applicable  Diagnoses and all orders for this visit:  Diagnoses and all orders for this visit:    Acute non-recurrent maxillary sinusitis  -     amoxicillin-clavulanate (AUGMENTIN) 500-125 MG per tablet; Take 1 tablet by mouth 2 times daily for 10 days  -     HYDROcodone homatropine (HYCODAN) 5-1.5 MG/5ML solution; Take 5 mLs by mouth every 4 hours as needed (cough) for up to 14 days. Max Daily Amount: 30 mLs    Productive cough  -     amoxicillin-clavulanate (AUGMENTIN) 500-125 MG per tablet; Take 1 tablet by mouth 2 times daily for 10 days  -     HYDROcodone homatropine (HYCODAN) 5-1.5 MG/5ML solution; Take 5 mLs by mouth every 4 hours as needed (cough) for up to 14 days. Max Daily Amount: 30 mLs    Other orders  -     fluconazole (DIFLUCAN) 150 MG tablet; Take 1 tablet by mouth once for 1 dose          Time: EV1 - 5-10 minutes were spent on the digital evaluation and management of this patient.      Betzaida Allan MD

## 2024-03-28 ENCOUNTER — TELEPHONE (OUTPATIENT)
Dept: PRIMARY CARE CLINIC | Facility: CLINIC | Age: 59
End: 2024-03-28

## 2024-03-28 NOTE — TELEPHONE ENCOUNTER
Patient states her sinuses are starting to get better but she is still coughing and feels light headed and fatigued. Per Dr Allan verbal orders, may take several days for antibiotics to work. Can switch to OTC cough medicine that is less sedating. Call back if not improving after she has finished abx. Pt notified, agrees with plan and will comply.

## 2024-03-28 NOTE — TELEPHONE ENCOUNTER
Pt states that her sinus infection cleared up but she still has a cough and is not feeling any better - requesting a call back

## 2024-04-18 ENCOUNTER — NURSE ONLY (OUTPATIENT)
Dept: PRIMARY CARE CLINIC | Facility: CLINIC | Age: 59
End: 2024-04-18

## 2024-04-18 DIAGNOSIS — D50.9 IRON DEFICIENCY ANEMIA, UNSPECIFIED IRON DEFICIENCY ANEMIA TYPE: ICD-10-CM

## 2024-04-18 DIAGNOSIS — E78.2 MIXED HYPERLIPIDEMIA: ICD-10-CM

## 2024-04-18 DIAGNOSIS — Z79.899 ENCOUNTER FOR LONG-TERM (CURRENT) USE OF MEDICATIONS: ICD-10-CM

## 2024-04-18 DIAGNOSIS — E55.9 VITAMIN D DEFICIENCY: Primary | ICD-10-CM

## 2024-04-18 DIAGNOSIS — R73.01 IMPAIRED FASTING GLUCOSE: ICD-10-CM

## 2024-04-18 LAB
25(OH)D3 SERPL-MCNC: 60.5 NG/ML (ref 30–100)
ALBUMIN SERPL-MCNC: 3.5 G/DL (ref 3.2–4.6)
ALBUMIN/GLOB SERPL: 0.9 (ref 0.4–1.6)
ALP SERPL-CCNC: 91 U/L (ref 50–136)
ALT SERPL-CCNC: 20 U/L (ref 12–65)
ANION GAP SERPL CALC-SCNC: 3 MMOL/L (ref 2–11)
AST SERPL-CCNC: 19 U/L (ref 15–37)
BASOPHILS # BLD: 0.1 K/UL (ref 0–0.2)
BASOPHILS NFR BLD: 1 % (ref 0–2)
BILIRUB SERPL-MCNC: 0.3 MG/DL (ref 0.2–1.1)
BUN SERPL-MCNC: 14 MG/DL (ref 8–23)
CALCIUM SERPL-MCNC: 9.1 MG/DL (ref 8.3–10.4)
CHLORIDE SERPL-SCNC: 109 MMOL/L (ref 103–113)
CHOLEST SERPL-MCNC: 213 MG/DL
CO2 SERPL-SCNC: 27 MMOL/L (ref 21–32)
CREAT SERPL-MCNC: 0.7 MG/DL (ref 0.6–1)
DIFFERENTIAL METHOD BLD: ABNORMAL
EOSINOPHIL # BLD: 0.1 K/UL (ref 0–0.8)
EOSINOPHIL NFR BLD: 2 % (ref 0.5–7.8)
ERYTHROCYTE [DISTWIDTH] IN BLOOD BY AUTOMATED COUNT: 15 % (ref 11.9–14.6)
GLOBULIN SER CALC-MCNC: 3.7 G/DL (ref 2.8–4.5)
GLUCOSE SERPL-MCNC: 92 MG/DL (ref 65–100)
HCT VFR BLD AUTO: 36.4 % (ref 35.8–46.3)
HDLC SERPL-MCNC: 57 MG/DL (ref 40–60)
HDLC SERPL: 3.7
HGB BLD-MCNC: 11.4 G/DL (ref 11.7–15.4)
IMM GRANULOCYTES # BLD AUTO: 0 K/UL (ref 0–0.5)
IMM GRANULOCYTES NFR BLD AUTO: 0 % (ref 0–5)
LDLC SERPL CALC-MCNC: 130.6 MG/DL
LYMPHOCYTES # BLD: 2.1 K/UL (ref 0.5–4.6)
LYMPHOCYTES NFR BLD: 40 % (ref 13–44)
MCH RBC QN AUTO: 28.6 PG (ref 26.1–32.9)
MCHC RBC AUTO-ENTMCNC: 31.3 G/DL (ref 31.4–35)
MCV RBC AUTO: 91.5 FL (ref 82–102)
MONOCYTES # BLD: 0.4 K/UL (ref 0.1–1.3)
MONOCYTES NFR BLD: 8 % (ref 4–12)
NEUTS SEG # BLD: 2.7 K/UL (ref 1.7–8.2)
NEUTS SEG NFR BLD: 49 % (ref 43–78)
NRBC # BLD: 0 K/UL (ref 0–0.2)
PLATELET # BLD AUTO: 273 K/UL (ref 150–450)
PMV BLD AUTO: 11 FL (ref 9.4–12.3)
POTASSIUM SERPL-SCNC: 4.2 MMOL/L (ref 3.5–5.1)
PROT SERPL-MCNC: 7.2 G/DL (ref 6.3–8.2)
RBC # BLD AUTO: 3.98 M/UL (ref 4.05–5.2)
SODIUM SERPL-SCNC: 139 MMOL/L (ref 136–146)
TIBC SERPL-MCNC: 358 UG/DL (ref 250–450)
TRIGL SERPL-MCNC: 127 MG/DL (ref 35–150)
VLDLC SERPL CALC-MCNC: 25.4 MG/DL (ref 6–23)
WBC # BLD AUTO: 5.4 K/UL (ref 4.3–11.1)

## 2024-04-19 LAB
EST. AVERAGE GLUCOSE BLD GHB EST-MCNC: 108 MG/DL
HBA1C MFR BLD: 5.4 % (ref 4.8–5.6)

## 2024-04-20 NOTE — RESULT ENCOUNTER NOTE
1. Cholesterol: 213 (was 210); LDL: 130.6 (was 132) - trying to control with diet. Will continue to monitor.     2. Hemoglobin: slightly low at 11.4 (was 11.8) - continue iron tablets daily.     Remainder of her labs are good.

## 2024-04-26 ENCOUNTER — TELEMEDICINE (OUTPATIENT)
Dept: PRIMARY CARE CLINIC | Facility: CLINIC | Age: 59
End: 2024-04-26
Payer: COMMERCIAL

## 2024-04-26 DIAGNOSIS — E78.2 MIXED HYPERLIPIDEMIA: ICD-10-CM

## 2024-04-26 DIAGNOSIS — D50.8 OTHER IRON DEFICIENCY ANEMIA: ICD-10-CM

## 2024-04-26 DIAGNOSIS — E66.9 OBESITY (BMI 35.0-39.9 WITHOUT COMORBIDITY): ICD-10-CM

## 2024-04-26 DIAGNOSIS — C44.90 SKIN CANCER: ICD-10-CM

## 2024-04-26 DIAGNOSIS — I10 ESSENTIAL HYPERTENSION: Primary | ICD-10-CM

## 2024-04-26 DIAGNOSIS — Z79.899 ENCOUNTER FOR LONG-TERM (CURRENT) USE OF MEDICATIONS: ICD-10-CM

## 2024-04-26 PROCEDURE — 99213 OFFICE O/P EST LOW 20 MIN: CPT | Performed by: FAMILY MEDICINE

## 2024-04-26 RX ORDER — FERROUS SULFATE 325(65) MG
325 TABLET ORAL
Qty: 90 TABLET | Refills: 1 | COMMUNITY
Start: 2024-04-26

## 2024-04-26 RX ORDER — SEMAGLUTIDE 1 MG/.5ML
1 INJECTION, SOLUTION SUBCUTANEOUS
Qty: 3 ML | Refills: 5 | Status: SHIPPED | OUTPATIENT
Start: 2024-04-26

## 2024-04-26 NOTE — PROGRESS NOTES
[facility] PRIMARY CARE  Betzaida Allan M.D.  [address] [place] [address]  Phone:  [phone]  Fax:  [phone]          I was in the office while conducting this encounter.  The patient was at home.    CHIEF COMPLAINT:  Chief Complaint   Patient presents with    Hypertension     Her blood pressure has been under control.     Anemia     She continues to take iron for anemia.    Skin Cancer     She has a history of skin cancer. She recently had a lesion removed from her nose.          HISTORY OF PRESENT ILLNESS:  Ms. Singer is a 58 y.o. female  who presents for follow up. Her blood pressure has been under control. She takes her medication daily as prescribed. Denies chest pain, shortness of breath, headaches or blurred vision.    She continues to take iron for anemia. She is doing well with the medication.     She has a history of skin cancer. She recently had a lesion removed from her nose. Patient states that she has had cancer removed from her skin all over her body.     She has been gaining weight. She would like to try Wegovy to help lose weight.    No other complaints. Taking medications as prescribed. Medications reviewed and updated.     HISTORY:  No Known Allergies      REVIEW OF SYSTEMS:  Review of systems is as indicated in HPI otherwise negative.    PHYSICAL EXAM:    Vital Signs: (As obtained by patient/caregiver at home)      4/24/2024     7:16 PM   Patient-Reported Vitals   Patient-Reported Weight 220   Patient-Reported Height 5’5”   Patient-Reported Systolic 149 mmHg   Patient-Reported Diastolic 81 mmHg   Patient-Reported Pulse 65   Patient-Reported Temperature 97.8   Patient-Reported SpO2 97% taken with Apple watch   Patient-Reported Peak Flow N/A         PHQ:      4/28/2024    11:59 PM   PHQ-9    Little interest or pleasure in doing things 0   Feeling down, depressed, or hopeless 0   PHQ-2 Score 0   PHQ-9 Total Score 0       LABS  No results found for this visit on

## 2024-04-28 PROBLEM — C44.90 SKIN CANCER: Status: ACTIVE | Noted: 2024-04-28

## 2024-04-28 ASSESSMENT — PATIENT HEALTH QUESTIONNAIRE - PHQ9
2. FEELING DOWN, DEPRESSED OR HOPELESS: NOT AT ALL
SUM OF ALL RESPONSES TO PHQ QUESTIONS 1-9: 0
SUM OF ALL RESPONSES TO PHQ9 QUESTIONS 1 & 2: 0
SUM OF ALL RESPONSES TO PHQ QUESTIONS 1-9: 0
1. LITTLE INTEREST OR PLEASURE IN DOING THINGS: NOT AT ALL

## 2024-05-07 ENCOUNTER — TELEPHONE (OUTPATIENT)
Dept: PRIMARY CARE CLINIC | Facility: CLINIC | Age: 59
End: 2024-05-07

## 2024-05-22 NOTE — TELEPHONE ENCOUNTER
Medication has been denied, *Drug Not Covered/Plan Exclusion - Your request for coverage was denied because your prescription benefit plan does not cover the requested medication. n

## 2024-09-06 DIAGNOSIS — E55.9 VITAMIN D DEFICIENCY: ICD-10-CM

## 2024-09-06 RX ORDER — ERGOCALCIFEROL 1.25 MG/1
CAPSULE, LIQUID FILLED ORAL
Qty: 12 CAPSULE | Refills: 1 | Status: SHIPPED | OUTPATIENT
Start: 2024-09-06

## 2025-03-02 DIAGNOSIS — E55.9 VITAMIN D DEFICIENCY: ICD-10-CM

## 2025-03-03 RX ORDER — ERGOCALCIFEROL 1.25 MG/1
CAPSULE, LIQUID FILLED ORAL
Qty: 12 CAPSULE | Refills: 1 | OUTPATIENT
Start: 2025-03-03

## 2025-03-18 ENCOUNTER — TELEPHONE (OUTPATIENT)
Dept: PRIMARY CARE CLINIC | Facility: CLINIC | Age: 60
End: 2025-03-18

## 2025-03-18 DIAGNOSIS — E55.9 VITAMIN D DEFICIENCY: ICD-10-CM

## 2025-03-18 RX ORDER — ERGOCALCIFEROL 1.25 MG/1
50000 CAPSULE, LIQUID FILLED ORAL WEEKLY
Qty: 12 CAPSULE | Refills: 1 | Status: SHIPPED | OUTPATIENT
Start: 2025-03-18

## 2025-03-18 NOTE — TELEPHONE ENCOUNTER
Patient requesting a refill on       vitamin D (ERGOCALCIFEROL) 1.25 MG (82549 UT) CAPS capsule       Needs to be sent to       [facility]/pharmacy #[id number] - [place] - [address] [address] -  [phone] - F [phone]  [facility] [address]  Phone: [phone]  Fax: [phone]

## 2025-03-28 ENCOUNTER — LAB (OUTPATIENT)
Dept: PRIMARY CARE CLINIC | Facility: CLINIC | Age: 60
End: 2025-03-28

## 2025-03-28 DIAGNOSIS — E55.9 VITAMIN D DEFICIENCY: ICD-10-CM

## 2025-03-28 DIAGNOSIS — Z13.29 SCREENING FOR ENDOCRINE, NUTRITIONAL, METABOLIC AND IMMUNITY DISORDER: ICD-10-CM

## 2025-03-28 DIAGNOSIS — Z13.228 SCREENING FOR ENDOCRINE, NUTRITIONAL, METABOLIC AND IMMUNITY DISORDER: ICD-10-CM

## 2025-03-28 DIAGNOSIS — R53.82 CHRONIC FATIGUE: ICD-10-CM

## 2025-03-28 DIAGNOSIS — Z13.0 SCREENING FOR IRON DEFICIENCY ANEMIA: ICD-10-CM

## 2025-03-28 DIAGNOSIS — R79.9 ABNORMAL BLOOD CHEMISTRY: ICD-10-CM

## 2025-03-28 DIAGNOSIS — Z13.21 ENCOUNTER FOR VITAMIN DEFICIENCY SCREENING: ICD-10-CM

## 2025-03-28 DIAGNOSIS — Z79.899 ENCOUNTER FOR LONG-TERM (CURRENT) USE OF MEDICATIONS: ICD-10-CM

## 2025-03-28 DIAGNOSIS — R53.81 MALAISE: ICD-10-CM

## 2025-03-28 DIAGNOSIS — Z13.0 SCREENING FOR DEFICIENCY ANEMIA: ICD-10-CM

## 2025-03-28 DIAGNOSIS — R79.89 OTHER SPECIFIED ABNORMAL FINDINGS OF BLOOD CHEMISTRY: ICD-10-CM

## 2025-03-28 DIAGNOSIS — Z13.21 SCREENING FOR ENDOCRINE, NUTRITIONAL, METABOLIC AND IMMUNITY DISORDER: ICD-10-CM

## 2025-03-28 DIAGNOSIS — R73.09 OTHER ABNORMAL GLUCOSE: ICD-10-CM

## 2025-03-28 DIAGNOSIS — Z13.0 SCREENING FOR ENDOCRINE, NUTRITIONAL, METABOLIC AND IMMUNITY DISORDER: ICD-10-CM

## 2025-03-28 DIAGNOSIS — Z13.1 SCREENING FOR DIABETES MELLITUS: ICD-10-CM

## 2025-03-28 DIAGNOSIS — Z13.1 SCREENING FOR DIABETES MELLITUS: Primary | ICD-10-CM

## 2025-03-28 DIAGNOSIS — Z13.228 SCREENING FOR METABOLIC DISORDER: ICD-10-CM

## 2025-03-28 DIAGNOSIS — Z13.220 SCREENING FOR LIPID DISORDERS: ICD-10-CM

## 2025-03-28 LAB
25(OH)D3 SERPL-MCNC: 45.4 NG/ML (ref 30–100)
ALBUMIN SERPL-MCNC: 3.5 G/DL (ref 3.2–4.6)
ALBUMIN/GLOB SERPL: 1.2 (ref 1–1.9)
ALP SERPL-CCNC: 85 U/L (ref 35–104)
ALT SERPL-CCNC: 14 U/L (ref 8–45)
ANION GAP SERPL CALC-SCNC: 10 MMOL/L (ref 7–16)
AST SERPL-CCNC: 23 U/L (ref 15–37)
BASOPHILS # BLD: 0.03 K/UL (ref 0–0.2)
BASOPHILS NFR BLD: 0.5 % (ref 0–2)
BILIRUB SERPL-MCNC: <0.2 MG/DL (ref 0–1.2)
BUN SERPL-MCNC: 10 MG/DL (ref 8–23)
CALCIUM SERPL-MCNC: 8.9 MG/DL (ref 8.8–10.2)
CHLORIDE SERPL-SCNC: 107 MMOL/L (ref 98–107)
CHOLEST SERPL-MCNC: 192 MG/DL (ref 0–200)
CO2 SERPL-SCNC: 26 MMOL/L (ref 20–29)
CREAT SERPL-MCNC: 0.68 MG/DL (ref 0.6–1.1)
DIFFERENTIAL METHOD BLD: ABNORMAL
EOSINOPHIL # BLD: 0.18 K/UL (ref 0–0.8)
EOSINOPHIL NFR BLD: 3 % (ref 0.5–7.8)
ERYTHROCYTE [DISTWIDTH] IN BLOOD BY AUTOMATED COUNT: 14.1 % (ref 11.9–14.6)
EST. AVERAGE GLUCOSE BLD GHB EST-MCNC: 113 MG/DL
FERRITIN SERPL-MCNC: 34 NG/ML (ref 8–388)
GLOBULIN SER CALC-MCNC: 3 G/DL (ref 2.3–3.5)
GLUCOSE SERPL-MCNC: 87 MG/DL (ref 70–99)
HBA1C MFR BLD: 5.6 % (ref 0–5.6)
HCT VFR BLD AUTO: 37.3 % (ref 35.8–46.3)
HDLC SERPL-MCNC: 50 MG/DL (ref 40–60)
HDLC SERPL: 3.8 (ref 0–5)
HGB BLD-MCNC: 11.6 G/DL (ref 11.7–15.4)
IMM GRANULOCYTES # BLD AUTO: 0.01 K/UL (ref 0–0.5)
IMM GRANULOCYTES NFR BLD AUTO: 0.2 % (ref 0–5)
IRON SATN MFR SERPL: 10 % (ref 20–50)
IRON SERPL-MCNC: 33 UG/DL (ref 35–100)
LDLC SERPL CALC-MCNC: 114 MG/DL (ref 0–100)
LYMPHOCYTES # BLD: 1.93 K/UL (ref 0.5–4.6)
LYMPHOCYTES NFR BLD: 31.7 % (ref 13–44)
MCH RBC QN AUTO: 29.7 PG (ref 26.1–32.9)
MCHC RBC AUTO-ENTMCNC: 31.1 G/DL (ref 31.4–35)
MCV RBC AUTO: 95.4 FL (ref 82–102)
MONOCYTES # BLD: 0.41 K/UL (ref 0.1–1.3)
MONOCYTES NFR BLD: 6.7 % (ref 4–12)
NEUTS SEG # BLD: 3.52 K/UL (ref 1.7–8.2)
NEUTS SEG NFR BLD: 57.9 % (ref 43–78)
NRBC # BLD: 0 K/UL (ref 0–0.2)
PLATELET # BLD AUTO: 290 K/UL (ref 150–450)
PMV BLD AUTO: 10.7 FL (ref 9.4–12.3)
POTASSIUM SERPL-SCNC: 4.1 MMOL/L (ref 3.5–5.1)
PROT SERPL-MCNC: 6.5 G/DL (ref 6.3–8.2)
RBC # BLD AUTO: 3.91 M/UL (ref 4.05–5.2)
SODIUM SERPL-SCNC: 143 MMOL/L (ref 136–145)
TIBC SERPL-MCNC: 323 UG/DL (ref 240–450)
TRIGL SERPL-MCNC: 138 MG/DL (ref 0–150)
UIBC SERPL-MCNC: 290 UG/DL (ref 112–347)
VLDLC SERPL CALC-MCNC: 28 MG/DL (ref 6–23)
WBC # BLD AUTO: 6.1 K/UL (ref 4.3–11.1)

## 2025-03-31 ENCOUNTER — RESULTS FOLLOW-UP (OUTPATIENT)
Dept: PRIMARY CARE CLINIC | Facility: CLINIC | Age: 60
End: 2025-03-31

## 2025-04-01 SDOH — ECONOMIC STABILITY: FOOD INSECURITY: WITHIN THE PAST 12 MONTHS, THE FOOD YOU BOUGHT JUST DIDN'T LAST AND YOU DIDN'T HAVE MONEY TO GET MORE.: NEVER TRUE

## 2025-04-01 SDOH — ECONOMIC STABILITY: FOOD INSECURITY: WITHIN THE PAST 12 MONTHS, YOU WORRIED THAT YOUR FOOD WOULD RUN OUT BEFORE YOU GOT MONEY TO BUY MORE.: NEVER TRUE

## 2025-04-01 SDOH — ECONOMIC STABILITY: INCOME INSECURITY: IN THE LAST 12 MONTHS, WAS THERE A TIME WHEN YOU WERE NOT ABLE TO PAY THE MORTGAGE OR RENT ON TIME?: NO

## 2025-04-01 SDOH — ECONOMIC STABILITY: TRANSPORTATION INSECURITY
IN THE PAST 12 MONTHS, HAS THE LACK OF TRANSPORTATION KEPT YOU FROM MEDICAL APPOINTMENTS OR FROM GETTING MEDICATIONS?: NO

## 2025-04-01 SDOH — ECONOMIC STABILITY: TRANSPORTATION INSECURITY
IN THE PAST 12 MONTHS, HAS LACK OF TRANSPORTATION KEPT YOU FROM MEETINGS, WORK, OR FROM GETTING THINGS NEEDED FOR DAILY LIVING?: NO

## 2025-04-01 ASSESSMENT — PATIENT HEALTH QUESTIONNAIRE - PHQ9
SUM OF ALL RESPONSES TO PHQ QUESTIONS 1-9: 0
2. FEELING DOWN, DEPRESSED OR HOPELESS: NOT AT ALL
SUM OF ALL RESPONSES TO PHQ QUESTIONS 1-9: 0
1. LITTLE INTEREST OR PLEASURE IN DOING THINGS: NOT AT ALL
2. FEELING DOWN, DEPRESSED OR HOPELESS: NOT AT ALL
SUM OF ALL RESPONSES TO PHQ QUESTIONS 1-9: 0
SUM OF ALL RESPONSES TO PHQ9 QUESTIONS 1 & 2: 0
1. LITTLE INTEREST OR PLEASURE IN DOING THINGS: NOT AT ALL
SUM OF ALL RESPONSES TO PHQ QUESTIONS 1-9: 0

## 2025-04-04 ENCOUNTER — TELEMEDICINE (OUTPATIENT)
Dept: PRIMARY CARE CLINIC | Facility: CLINIC | Age: 60
End: 2025-04-04
Payer: COMMERCIAL

## 2025-04-04 DIAGNOSIS — Z79.899 ENCOUNTER FOR LONG-TERM (CURRENT) USE OF MEDICATIONS: ICD-10-CM

## 2025-04-04 DIAGNOSIS — I10 ESSENTIAL HYPERTENSION: Primary | ICD-10-CM

## 2025-04-04 DIAGNOSIS — E66.9 OBESITY (BMI 30-39.9): ICD-10-CM

## 2025-04-04 DIAGNOSIS — E78.2 MIXED HYPERLIPIDEMIA: ICD-10-CM

## 2025-04-04 DIAGNOSIS — D50.8 OTHER IRON DEFICIENCY ANEMIA: ICD-10-CM

## 2025-04-04 PROCEDURE — 99214 OFFICE O/P EST MOD 30 MIN: CPT | Performed by: FAMILY MEDICINE

## 2025-04-04 RX ORDER — IRON POLYSACCHARIDE COMPLEX 150 MG
150 CAPSULE ORAL DAILY
Qty: 90 CAPSULE | Refills: 3 | Status: CANCELLED | OUTPATIENT
Start: 2025-04-04

## 2025-04-04 RX ORDER — FERROUS SULFATE 325(65) MG
325 TABLET ORAL
COMMUNITY
End: 2025-04-04 | Stop reason: SDUPTHER

## 2025-04-04 RX ORDER — IRON POLYSACCHARIDE COMPLEX 150 MG
150 CAPSULE ORAL DAILY
Qty: 30 CAPSULE | Refills: 5 | Status: SHIPPED | OUTPATIENT
Start: 2025-04-04

## 2025-04-04 NOTE — PROGRESS NOTES
[facility] PRIMARY CARE  Betzaida Allan M.D.  [address] [place] [address]  Phone:  [phone]  Fax:  [phone]          I was in the office while conducting this encounter.  The patient was at home.    CHIEF COMPLAINT:  Chief Complaint   Patient presents with    Results     Patient recently had lab work done, would like to discuss results       Weight Management     Patient wants to try to lose weight. She is not diabetic. She does snore, but does not want to have sleep study or get a cpap machine.           HISTORY OF PRESENT ILLNESS:  Ms. Singer is a 59 y.o. female  who presents for follow up. The patient's recent labs show low iron levels, with consistently low RBC since 2023. She has been taking over-the-counter iron supplements due to insurance not covering the prescribed medication. Her iron percent saturation is low, indicating low iron in her red blood cells.    The patient mentions a spot on her right leg above her ankle that she is concerned might be skin cancer. She has a history of previous skin cancer surgery on her face with Dr. Dodge at [facility]. She plans to follow up with her dermatologist.    The patient reports difficulty sleeping and snoring, which may be indicative of sleep apnea. She expresses interest in Mounjaro for potential weight loss and sleep apnea management but notes insurance coverage issues. The patient's  uses Mounjaro with a coupon, paying $25, and she would like to try the same approach.    The patient reports being busy managing her sister Danielle's health issues, which has impacted her ability to focus on her own health.     No other complaints. Taking medications as prescribed. Medications reviewed and updated.     HISTORY:  No Known Allergies      REVIEW OF SYSTEMS:  Review of systems is as indicated in HPI otherwise negative.    PHYSICAL EXAM:    Vital Signs: (As obtained by patient/caregiver at home)      4/24/2024      Statement Selected

## 2025-04-07 ENCOUNTER — CLINICAL SUPPORT (OUTPATIENT)
Dept: PRIMARY CARE CLINIC | Facility: CLINIC | Age: 60
End: 2025-04-07

## 2025-04-07 VITALS — SYSTOLIC BLOOD PRESSURE: 134 MMHG | DIASTOLIC BLOOD PRESSURE: 84 MMHG

## 2025-04-11 DIAGNOSIS — J30.1 SEASONAL ALLERGIC RHINITIS DUE TO POLLEN: ICD-10-CM

## 2025-04-11 RX ORDER — FLUTICASONE PROPIONATE 50 MCG
2 SPRAY, SUSPENSION (ML) NASAL DAILY
Qty: 16 G | Refills: 5 | Status: SHIPPED | OUTPATIENT
Start: 2025-04-11